# Patient Record
Sex: FEMALE | Race: WHITE | NOT HISPANIC OR LATINO
[De-identification: names, ages, dates, MRNs, and addresses within clinical notes are randomized per-mention and may not be internally consistent; named-entity substitution may affect disease eponyms.]

---

## 2020-02-07 LAB — PAP SMEAR - HIM PATIENT REPORTED: NEGATIVE

## 2020-10-06 DIAGNOSIS — G43.019 INTRACTABLE MIGRAINE WITHOUT AURA AND WITHOUT STATUS MIGRAINOSUS: Primary | ICD-10-CM

## 2020-10-06 PROBLEM — G43.909 MIGRAINE HEADACHE: Status: ACTIVE | Noted: 2020-10-06

## 2020-10-06 RX ORDER — BUTALBITAL, ACETAMINOPHEN AND CAFFEINE 50; 325; 40 MG/1; MG/1; MG/1
2 TABLET ORAL EVERY 6 HOURS PRN
Qty: 60 TABLET | Refills: 0 | Status: SHIPPED | OUTPATIENT
Start: 2020-10-06 | End: 2020-12-15

## 2020-10-06 RX ORDER — BUTALBITAL, ACETAMINOPHEN AND CAFFEINE 50; 325; 40 MG/1; MG/1; MG/1
2 TABLET ORAL EVERY 6 HOURS PRN
COMMUNITY
Start: 2020-06-29 | End: 2020-10-06

## 2020-10-06 NOTE — TELEPHONE ENCOUNTER
Overdue for follow up  Medication T'd for review and signature  Abdulaziz Byers CMA on 10/6/2020 at 7:49 AM

## 2020-12-14 DIAGNOSIS — G43.019 INTRACTABLE MIGRAINE WITHOUT AURA AND WITHOUT STATUS MIGRAINOSUS: ICD-10-CM

## 2020-12-14 NOTE — TELEPHONE ENCOUNTER
Pt calling for a refill of Butalbitol at [facility] in [place]. She will sched a 6 month F/U.

## 2020-12-15 RX ORDER — BUTALBITAL, ACETAMINOPHEN AND CAFFEINE 50; 325; 40 MG/1; MG/1; MG/1
2 TABLET ORAL EVERY 6 HOURS PRN
Qty: 60 TABLET | Refills: 0 | Status: SHIPPED | OUTPATIENT
Start: 2020-12-15 | End: 2021-02-03

## 2020-12-18 DIAGNOSIS — G43.019 INTRACTABLE MIGRAINE WITHOUT AURA AND WITHOUT STATUS MIGRAINOSUS: Primary | ICD-10-CM

## 2020-12-18 RX ORDER — DIVALPROEX SODIUM 250 MG/1
1 TABLET, EXTENDED RELEASE ORAL DAILY
COMMUNITY
Start: 2020-08-31 | End: 2020-12-18

## 2020-12-18 RX ORDER — DIVALPROEX SODIUM 250 MG/1
250 TABLET, EXTENDED RELEASE ORAL DAILY
Qty: 30 TABLET | Refills: 0 | Status: SHIPPED | OUTPATIENT
Start: 2020-12-18 | End: 2020-12-24

## 2020-12-18 NOTE — TELEPHONE ENCOUNTER
due for follow up   Medication T'd for review and signature  Abdulaziz Byers CMA on 12/18/2020 at 9:15 AM

## 2020-12-18 NOTE — LETTER
12/18/2020        RE: Pao Baeza  [address] [place] [address]            Dear Pao,      We recently provided you with medication refills.  Many medications require routine follow-up with your doctor.    Your prescription(s) have been refilled for 90 days so you may have time for the above noted follow-up. Please call to schedule soon so we can assure you have an appointment before your next refills are needed. If you have already made a follow up appointment, please disregard this letter.             Sincerely,        Bean Mercer MD  [facility]     (Formerly known as [facility])

## 2020-12-24 RX ORDER — DIVALPROEX SODIUM 250 MG/1
250 TABLET, EXTENDED RELEASE ORAL DAILY
Qty: 90 TABLET | Refills: 0 | Status: SHIPPED | OUTPATIENT
Start: 2020-12-24 | End: 2021-02-03

## 2020-12-24 NOTE — TELEPHONE ENCOUNTER
Insurance only covers 90 day supply  She has an appt scheduled for March  Medication T'd for review and signature  Preethi Ojeda CMA on 12/24/2020 at 11:40 AM

## 2021-02-03 ENCOUNTER — RECORDS - HEALTHEAST (OUTPATIENT)
Dept: ADMINISTRATIVE | Facility: OTHER | Age: 46
End: 2021-02-03

## 2021-02-03 ENCOUNTER — VIRTUAL VISIT (OUTPATIENT)
Dept: NEUROLOGY | Facility: CLINIC | Age: 46
End: 2021-02-03
Payer: COMMERCIAL

## 2021-02-03 VITALS — BODY MASS INDEX: 23.7 KG/M2 | HEIGHT: 66 IN | WEIGHT: 147.5 LBS

## 2021-02-03 DIAGNOSIS — R55 TRANSIENT LOSS OF CONSCIOUSNESS: ICD-10-CM

## 2021-02-03 DIAGNOSIS — G43.019 INTRACTABLE MIGRAINE WITHOUT AURA AND WITHOUT STATUS MIGRAINOSUS: Primary | ICD-10-CM

## 2021-02-03 PROBLEM — R40.20 LOC (LOSS OF CONSCIOUSNESS) (H): Status: ACTIVE | Noted: 2018-05-25

## 2021-02-03 PROCEDURE — 99214 OFFICE O/P EST MOD 30 MIN: CPT | Mod: 95 | Performed by: PSYCHIATRY & NEUROLOGY

## 2021-02-03 RX ORDER — TIZANIDINE 2 MG/1
2 TABLET ORAL DAILY PRN
COMMUNITY
Start: 2021-01-15

## 2021-02-03 RX ORDER — DIVALPROEX SODIUM 250 MG/1
250 TABLET, EXTENDED RELEASE ORAL DAILY
Qty: 90 TABLET | Refills: 3 | Status: SHIPPED | OUTPATIENT
Start: 2021-02-03

## 2021-02-03 RX ORDER — RIZATRIPTAN BENZOATE 10 MG/1
10 TABLET ORAL PRN
Qty: 12 TABLET | Refills: 11 | Status: SHIPPED | OUTPATIENT
Start: 2021-02-03

## 2021-02-03 RX ORDER — BUTALBITAL, ACETAMINOPHEN AND CAFFEINE 50; 325; 40 MG/1; MG/1; MG/1
2 TABLET ORAL EVERY 6 HOURS PRN
Qty: 60 TABLET | Refills: 5 | Status: SHIPPED | OUTPATIENT
Start: 2021-02-03 | End: 2022-01-20

## 2021-02-03 RX ORDER — LAMOTRIGINE 25 MG/1
50 TABLET ORAL DAILY
Qty: 180 TABLET | Refills: 3 | Status: SHIPPED | OUTPATIENT
Start: 2021-02-03 | End: 2022-02-25

## 2021-02-03 RX ORDER — RIZATRIPTAN BENZOATE 10 MG/1
10 TABLET ORAL PRN
COMMUNITY
Start: 2019-06-03 | End: 2021-02-03

## 2021-02-03 RX ORDER — FLUTICASONE PROPIONATE 50 MCG
1-2 SPRAY, SUSPENSION (ML) NASAL
Status: ON HOLD | COMMUNITY
End: 2022-03-08

## 2021-02-03 RX ORDER — MULTIVITAMIN
1 TABLET ORAL DAILY
COMMUNITY
Start: 2019-09-06

## 2021-02-03 RX ORDER — GABAPENTIN 300 MG/1
600 CAPSULE ORAL 3 TIMES DAILY
COMMUNITY
Start: 2019-09-06

## 2021-02-03 RX ORDER — ATENOLOL 50 MG/1
100 TABLET ORAL DAILY
Status: ON HOLD | COMMUNITY
Start: 2021-01-15 | End: 2022-03-08

## 2021-02-03 RX ORDER — PREDNISONE 20 MG/1
20 TABLET ORAL DAILY
Qty: 3 TABLET | Refills: 0 | Status: SHIPPED | OUTPATIENT
Start: 2021-02-03 | End: 2021-02-06

## 2021-02-03 RX ORDER — DESOGESTREL AND ETHINYL ESTRADIOL 0.15-0.03
1 KIT ORAL
Status: ON HOLD | COMMUNITY
Start: 2021-01-15 | End: 2022-03-08

## 2021-02-03 RX ORDER — DICYCLOMINE HCL 20 MG
20 TABLET ORAL 4 TIMES DAILY
Status: ON HOLD | COMMUNITY
Start: 2021-01-15 | End: 2022-03-08

## 2021-02-03 RX ORDER — LAMOTRIGINE 25 MG/1
2 TABLET ORAL DAILY
COMMUNITY
Start: 2020-03-26 | End: 2021-02-03

## 2021-02-03 RX ORDER — AMITRIPTYLINE HYDROCHLORIDE 10 MG/1
TABLET ORAL
COMMUNITY
Start: 2021-01-15

## 2021-02-03 ASSESSMENT — MIFFLIN-ST. JEOR: SCORE: 1340.81

## 2021-02-03 NOTE — NURSING NOTE
Chief Complaint   Patient presents with     Follow Up     1/13/2021 patient reports that she had LOC for 20 minutes, she had some additional symptoms after that with frequent migraines as well. She was not seen in ER or UC however was advised by pco to follow up with Neurology for further work up      Video visit Smart phone   [phone]  Abdulaziz Byers CMA on 2/3/2021 at 9:23 AM

## 2021-02-03 NOTE — PROGRESS NOTES
"[facility] Neurology  [place]    Pao Baeza MRN# [id number]   Age: 45 year old YOB: 1975               Assessment and Plan:   Assessment:   Migraine headaches  Episode of decreased awareness on January 13, 2021        Plan:   Orders Placed This Encounter   Procedures     MR Brain w/o & w Contrast     EEG Routine     I renewed her migraine medications as well.  If the EEG shows any evidence of seizure, that might explain her episode in mid January.  She is already on a small dose of lamotrigine and some Depakote for her migraines.  If there is evidence of seizure then we would need to discuss increasing the lamotrigine or the Depakote.             Chief Complaint/HPI:     I saw the patient for a video visit today.  I last saw her in June 2019.  About 3 weeks ago she had \"a different migraine.\"  This occurred around midday.  For 6 seconds she thought \"oh I am dizzy, although I am going to pass out.\"  With her migraines, normally she is not completely unconscious.  On this occasion she had put on a sweater but it was inside out and that is unusual for her.  She felt like her ankles did not work, her fingers and face were numb.  This lasted for about 2 hours, she could not go to sleep.  She did not want to take any medication for it.  The next day she had difficulty with near vision.  She had trouble putting on make-up and logging in on her computer.  Then, around 9 AM, her vision was fine but then she had a massive migraine.  For 3 weeks she has had migraine every other day.  The meds to work.  She will take 2 of the Fioricet and if the headache is not gone 45 minutes later will she will take the Maxalt and that will usually take care of it.  It looks like she has been on lamotrigine since December 2020.            Past Medical History:    has a past medical history of Hypertension and Migraines.          Past Surgical History:    has no past surgical history on file.          Social " History:     Social History     Tobacco Use     Smoking status: Never Smoker     Smokeless tobacco: Never Used   Substance Use Topics     Alcohol use: Not Currently             Family History:     Family History   Problem Relation Age of Onset     Fibromyalgia Mother      Arthritis Mother      Migraines Mother      Depression Father      Dementia Father                 Allergies:     Allergies   Allergen Reactions     Erythromycin      severe vomiting     Sulfa Drugs      Other reaction(s): Vomiting     Tetracyclines      vomiting             Medications:     Current Outpatient Medications:      amitriptyline (ELAVIL) 10 MG tablet, Take 2 tabs by mouth at bedtime, may take 1-2 tabs during the day if needed., Disp: , Rfl:      atenolol (TENORMIN) 50 MG tablet, Take 100 mg by mouth daily, Disp: , Rfl:      butalbital-acetaminophen-caffeine (ESGIC) -40 MG tablet, Take 2 tablets by mouth every 6 hours as needed for headaches, Disp: 60 tablet, Rfl: 5     desogestrel-ethinyl estradiol (APRI) 0.15-30 MG-MCG tablet, Take 1 tablet by mouth, Disp: , Rfl:      dicyclomine (BENTYL) 20 MG tablet, Take 20 mg by mouth 4 times daily, Disp: , Rfl:      divalproex sodium extended-release (DEPAKOTE ER) 250 MG 24 hr tablet, Take 1 tablet (250 mg) by mouth daily MUST CALL TO SCHEDULE FOLLOW UP APPT, Disp: 90 tablet, Rfl: 3     fluticasone (FLONASE) 50 MCG/ACT nasal spray, Spray 1-2 sprays in nostril, Disp: , Rfl:      gabapentin (NEURONTIN) 300 MG capsule, Take 600 mg by mouth 3 times daily, Disp: , Rfl:      lamoTRIgine (LAMICTAL) 25 MG tablet, Take 2 tablets (50 mg) by mouth daily, Disp: 180 tablet, Rfl: 3     Multiple Vitamin (DAILY REGINA) TABS, Take 1 tablet by mouth daily, Disp: , Rfl:      predniSONE (DELTASONE) 20 MG tablet, Take 1 tablet (20 mg) by mouth daily for 3 days, Disp: 3 tablet, Rfl: 0     rizatriptan (MAXALT) 10 MG tablet, Take 1 tablet (10 mg) by mouth as needed for migraine, Disp: 12 tablet, Rfl: 11      tiZANidine (ZANAFLEX) 2 MG tablet, Take 2 mg by mouth daily as needed, Disp: , Rfl:               Physical Exam:   Awake and alert with no aphasia no dysarthria  Speech is clear and coherent  Cranial nerves are fine  I do not see any focal or lateralized weakness or coordination difficulties in the arms  Gait looks steady here on the video.          Video-Visit Details    Type of service:  Video Visit    Video Start Time: 9:37 AM  Video End Time: 9:52 AM    Originating Location (pt. Location): Home  Distant Location (provider location):  [facility] NEUROLOGY [place]   Platform used for Video Visit: Te Mercer MD

## 2021-02-12 ENCOUNTER — HOSPITAL ENCOUNTER (OUTPATIENT)
Dept: MRI IMAGING | Facility: HOSPITAL | Age: 46
Discharge: HOME | End: 2021-02-12
Attending: PSYCHIATRY & NEUROLOGY

## 2021-02-12 DIAGNOSIS — G43.019 INTRACTABLE MIGRAINE WITHOUT AURA AND WITHOUT STATUS MIGRAINOSUS: ICD-10-CM

## 2021-02-12 DIAGNOSIS — R55 TRANSIENT LOSS OF CONSCIOUSNESS: ICD-10-CM

## 2021-03-08 ENCOUNTER — ANCILLARY PROCEDURE (OUTPATIENT)
Dept: NEUROLOGY | Facility: CLINIC | Age: 46
End: 2021-03-08
Attending: PSYCHIATRY & NEUROLOGY
Payer: COMMERCIAL

## 2021-03-08 DIAGNOSIS — R55 TRANSIENT LOSS OF CONSCIOUSNESS: ICD-10-CM

## 2021-03-08 DIAGNOSIS — G43.019 INTRACTABLE MIGRAINE WITHOUT AURA AND WITHOUT STATUS MIGRAINOSUS: ICD-10-CM

## 2021-03-08 PROCEDURE — 95816 EEG AWAKE AND DROWSY: CPT | Performed by: PSYCHIATRY & NEUROLOGY

## 2021-05-01 ENCOUNTER — HEALTH MAINTENANCE LETTER (OUTPATIENT)
Age: 46
End: 2021-05-01

## 2021-05-31 ENCOUNTER — RECORDS - HEALTHEAST (OUTPATIENT)
Dept: ADMINISTRATIVE | Facility: CLINIC | Age: 46
End: 2021-05-31

## 2021-06-01 ENCOUNTER — RECORDS - HEALTHEAST (OUTPATIENT)
Dept: ADMINISTRATIVE | Facility: CLINIC | Age: 46
End: 2021-06-01

## 2021-10-11 ENCOUNTER — HEALTH MAINTENANCE LETTER (OUTPATIENT)
Age: 46
End: 2021-10-11

## 2021-11-15 LAB — HEP C HIM: NORMAL

## 2021-11-17 LAB
ABO (EXTERNAL): NORMAL
HEMOGLOBIN (EXTERNAL): 13.3 G/DL (ref 12–16)
HEPATITIS B SURFACE ANTIGEN (EXTERNAL): NONREACTIVE
HIV1+2 AB SERPL QL IA: NEGATIVE
PLATELET COUNT (EXTERNAL): 208 10E3/UL (ref 150–400)
RH (EXTERNAL): POSITIVE
RUBELLA ANTIBODY IGG (EXTERNAL): NORMAL
TREPONEMA PALLIDUM ANTIBODY (EXTERNAL): NONREACTIVE

## 2021-11-22 ENCOUNTER — TRANSFERRED RECORDS (OUTPATIENT)
Dept: HEALTH INFORMATION MANAGEMENT | Facility: CLINIC | Age: 46
End: 2021-11-22
Payer: COMMERCIAL

## 2021-11-22 ENCOUNTER — MEDICAL CORRESPONDENCE (OUTPATIENT)
Dept: HEALTH INFORMATION MANAGEMENT | Facility: CLINIC | Age: 46
End: 2021-11-22
Payer: COMMERCIAL

## 2021-11-23 ENCOUNTER — TRANSCRIBE ORDERS (OUTPATIENT)
Dept: MATERNAL FETAL MEDICINE | Facility: HOSPITAL | Age: 46
End: 2021-11-23
Payer: COMMERCIAL

## 2021-11-23 DIAGNOSIS — O26.90 PREGNANCY RELATED CONDITION, ANTEPARTUM: Primary | ICD-10-CM

## 2021-12-01 ENCOUNTER — TRANSCRIBE ORDERS (OUTPATIENT)
Dept: MATERNAL FETAL MEDICINE | Facility: HOSPITAL | Age: 46
End: 2021-12-01
Payer: COMMERCIAL

## 2021-12-01 DIAGNOSIS — O26.90 PREGNANCY RELATED CONDITION, ANTEPARTUM: Primary | ICD-10-CM

## 2021-12-07 ENCOUNTER — HOSPITAL ENCOUNTER (EMERGENCY)
Facility: HOSPITAL | Age: 46
Discharge: HOME | End: 2021-12-07
Attending: EMERGENCY MEDICINE | Admitting: EMERGENCY MEDICINE
Payer: COMMERCIAL

## 2021-12-07 VITALS
OXYGEN SATURATION: 100 % | WEIGHT: 147 LBS | BODY MASS INDEX: 23.07 KG/M2 | HEIGHT: 67 IN | SYSTOLIC BLOOD PRESSURE: 115 MMHG | DIASTOLIC BLOOD PRESSURE: 66 MMHG | HEART RATE: 83 BPM | RESPIRATION RATE: 18 BRPM | TEMPERATURE: 97.6 F

## 2021-12-07 DIAGNOSIS — Z3A.23 23 WEEKS GESTATION OF PREGNANCY: ICD-10-CM

## 2021-12-07 DIAGNOSIS — R00.2 PALPITATIONS: ICD-10-CM

## 2021-12-07 DIAGNOSIS — E83.42 HYPOMAGNESEMIA: ICD-10-CM

## 2021-12-07 DIAGNOSIS — G43.909 MIGRAINE WITHOUT STATUS MIGRAINOSUS, NOT INTRACTABLE, UNSPECIFIED MIGRAINE TYPE: ICD-10-CM

## 2021-12-07 LAB
ALBUMIN UR-MCNC: NEGATIVE MG/DL
ANION GAP SERPL CALCULATED.3IONS-SCNC: 9 MMOL/L (ref 5–18)
APPEARANCE UR: CLEAR
BACTERIA #/AREA URNS HPF: ABNORMAL /HPF
BILIRUB UR QL STRIP: NEGATIVE
BNP SERPL-MCNC: 11 PG/ML (ref 0–64)
BUN SERPL-MCNC: 7 MG/DL (ref 8–22)
CALCIUM SERPL-MCNC: 8.6 MG/DL (ref 8.5–10.5)
CHLORIDE BLD-SCNC: 105 MMOL/L (ref 98–107)
CO2 SERPL-SCNC: 24 MMOL/L (ref 22–31)
COLOR UR AUTO: ABNORMAL
CREAT SERPL-MCNC: 0.57 MG/DL (ref 0.6–1.1)
ERYTHROCYTE [DISTWIDTH] IN BLOOD BY AUTOMATED COUNT: 13.7 % (ref 10–15)
GFR SERPL CREATININE-BSD FRML MDRD: >90 ML/MIN/1.73M2
GLUCOSE BLD-MCNC: 91 MG/DL (ref 70–125)
GLUCOSE UR STRIP-MCNC: NEGATIVE MG/DL
HCT VFR BLD AUTO: 40.2 % (ref 35–47)
HGB BLD-MCNC: 13.1 G/DL (ref 11.7–15.7)
HGB UR QL STRIP: NEGATIVE
HOLD SPECIMEN: NORMAL
KETONES UR STRIP-MCNC: NEGATIVE MG/DL
LEUKOCYTE ESTERASE UR QL STRIP: ABNORMAL
MAGNESIUM SERPL-MCNC: 1.7 MG/DL (ref 1.8–2.6)
MCH RBC QN AUTO: 30.7 PG (ref 26.5–33)
MCHC RBC AUTO-ENTMCNC: 32.6 G/DL (ref 31.5–36.5)
MCV RBC AUTO: 94 FL (ref 78–100)
MUCOUS THREADS #/AREA URNS LPF: PRESENT /LPF
NITRATE UR QL: NEGATIVE
PH UR STRIP: 7 [PH] (ref 5–7)
PLATELET # BLD AUTO: 196 10E3/UL (ref 150–450)
POTASSIUM BLD-SCNC: 3.6 MMOL/L (ref 3.5–5)
RBC # BLD AUTO: 4.27 10E6/UL (ref 3.8–5.2)
RBC URINE: <1 /HPF
SODIUM SERPL-SCNC: 138 MMOL/L (ref 136–145)
SP GR UR STRIP: 1.02 (ref 1–1.03)
SQUAMOUS EPITHELIAL: <1 /HPF
TROPONIN I SERPL-MCNC: <0.01 NG/ML (ref 0–0.29)
TSH SERPL DL<=0.005 MIU/L-ACNC: 1.76 UIU/ML (ref 0.3–5)
UROBILINOGEN UR STRIP-MCNC: <2 MG/DL
WBC # BLD AUTO: 7.8 10E3/UL (ref 4–11)
WBC URINE: 12 /HPF

## 2021-12-07 PROCEDURE — 85027 COMPLETE CBC AUTOMATED: CPT | Performed by: EMERGENCY MEDICINE

## 2021-12-07 PROCEDURE — 80048 BASIC METABOLIC PNL TOTAL CA: CPT | Performed by: EMERGENCY MEDICINE

## 2021-12-07 PROCEDURE — 250N000011 HC RX IP 250 OP 636: Performed by: EMERGENCY MEDICINE

## 2021-12-07 PROCEDURE — 258N000003 HC RX IP 258 OP 636: Performed by: EMERGENCY MEDICINE

## 2021-12-07 PROCEDURE — 87086 URINE CULTURE/COLONY COUNT: CPT | Performed by: EMERGENCY MEDICINE

## 2021-12-07 PROCEDURE — 83880 ASSAY OF NATRIURETIC PEPTIDE: CPT | Performed by: EMERGENCY MEDICINE

## 2021-12-07 PROCEDURE — 84443 ASSAY THYROID STIM HORMONE: CPT | Performed by: EMERGENCY MEDICINE

## 2021-12-07 PROCEDURE — 84484 ASSAY OF TROPONIN QUANT: CPT | Performed by: EMERGENCY MEDICINE

## 2021-12-07 PROCEDURE — 83735 ASSAY OF MAGNESIUM: CPT | Performed by: EMERGENCY MEDICINE

## 2021-12-07 PROCEDURE — 93005 ELECTROCARDIOGRAM TRACING: CPT | Performed by: EMERGENCY MEDICINE

## 2021-12-07 PROCEDURE — 81001 URINALYSIS AUTO W/SCOPE: CPT | Performed by: EMERGENCY MEDICINE

## 2021-12-07 PROCEDURE — 96365 THER/PROPH/DIAG IV INF INIT: CPT

## 2021-12-07 PROCEDURE — 99284 EMERGENCY DEPT VISIT MOD MDM: CPT | Mod: 25

## 2021-12-07 PROCEDURE — 96366 THER/PROPH/DIAG IV INF ADDON: CPT

## 2021-12-07 PROCEDURE — 36415 COLL VENOUS BLD VENIPUNCTURE: CPT | Performed by: EMERGENCY MEDICINE

## 2021-12-07 PROCEDURE — 96375 TX/PRO/DX INJ NEW DRUG ADDON: CPT

## 2021-12-07 RX ORDER — METOCLOPRAMIDE HYDROCHLORIDE 5 MG/ML
5 INJECTION INTRAMUSCULAR; INTRAVENOUS ONCE
Status: COMPLETED | OUTPATIENT
Start: 2021-12-07 | End: 2021-12-07

## 2021-12-07 RX ORDER — MAGNESIUM SULFATE HEPTAHYDRATE 40 MG/ML
2 INJECTION, SOLUTION INTRAVENOUS ONCE
Status: COMPLETED | OUTPATIENT
Start: 2021-12-07 | End: 2021-12-07

## 2021-12-07 RX ADMIN — SODIUM CHLORIDE, POTASSIUM CHLORIDE, SODIUM LACTATE AND CALCIUM CHLORIDE 1000 ML: 600; 310; 30; 20 INJECTION, SOLUTION INTRAVENOUS at 13:49

## 2021-12-07 RX ADMIN — MAGNESIUM SULFATE HEPTAHYDRATE 2 G: 40 INJECTION, SOLUTION INTRAVENOUS at 13:10

## 2021-12-07 RX ADMIN — METOCLOPRAMIDE HYDROCHLORIDE 5 MG: 5 INJECTION INTRAMUSCULAR; INTRAVENOUS at 13:48

## 2021-12-07 ASSESSMENT — ENCOUNTER SYMPTOMS
PALPITATIONS: 1
JOINT SWELLING: 0
ABDOMINAL PAIN: 0
HEMATURIA: 0
SHORTNESS OF BREATH: 0
DYSURIA: 0
DIARRHEA: 0
NAUSEA: 1
FEVER: 0
CONFUSION: 0
VOMITING: 0
SORE THROAT: 0
DIZZINESS: 1
FATIGUE: 1
HEADACHES: 1
CHILLS: 0

## 2021-12-07 ASSESSMENT — MIFFLIN-ST. JEOR: SCORE: 1354.42

## 2021-12-07 NOTE — ED PROVIDER NOTES
"    ED Provider In Triage Note  [facility]  Encounter Date: Dec 7, 2021    Chief Complaint   Patient presents with     Palpitations       Brief HPI:   Pao Baeza is a 45 year old female presenting to the Emergency Department with a chief complaint of heart racing (Max  at home).    Began 2:00p yesterday and then got better with resting. Now it is back.    Just found she is pregnant about 3 weeks ago. +nausea, dizziness, SOB, sneezing. She wonders if she \"passed out\" while lying on the couch. She does not think she was just sleeping.    No fever or cough.     Pt has had multiple falls lately and works with Neuro for this as they think it is a \"brain stem migraine\".    This is her second pregnancy (due April 2, 2022) and her son is 20 yrs old.    She is also feeling a migraine coming on - usually she will experience slurred speech and trouble speaking, trouble focusing. She will also have involuntary movement and loss of consciousness (for usually 2-5 minutes).  She reports HA 8/10.    While patient gave initial history in Triage her speech her behavior was completely normal then a few minutes in she states a migraine is coming on and her speech became more deliberate and slow. When distracted her speech is     Her OB is [facility].      Brief Physical Exam:  /80   Pulse 112   Temp 97.6  F (36.4  C) (Temporal)   Resp 19   Ht 1.702 m (5' 7\")   Wt 66.7 kg (147 lb)   SpO2 100%   BMI 23.02 kg/m    General: Non-toxic appearing  HEENT: Atraumatic  Resp: No respiratory distress  Abdomen: Non-peritoneal, +gravid  Neuro: Alert, oriented, answers questions appropriately  Psych: Behavior appropriate        Plan Initiated in Triage:  Basic labs ordered      PIT Dispo:   Place patient in the next available ED bed          Nilda Dinero MD on 12/7/2021 at 12:04 PM        Patient was evaluated by the Physician in Triage due to a limitation of available rooms in the " Emergency Department. A plan of care was discussed based on the information obtained on the initial evaluation and patient was consuled to return back to the Emergency Department lobby after this initial evalutaiton until results were obtained or a room became available in the Emergency Department. Patient was counseled not to leave prior to receiving the results of their workup.            Nilda Dinero MD  12/07/21 1212       Nilda Dinero MD  12/07/21 1257

## 2021-12-07 NOTE — DISCHARGE INSTRUCTIONS
Continue close follow up with your ob provider for ongoing evaluation and care. Follow up with primary providers as well including primary doctor and neurologist you might see.  Take over the counter magnesium replacement.  Return for new emergency concerns.

## 2021-12-07 NOTE — ED TRIAGE NOTES
Pt is 23 weeks AOG started having palpitations since yesterday 112-114.   Pt has hx of migraine headache and having slurred speech, fast HR  and hard to focus.  Having headache now.  Pt denies any vaginal bleeding and abd cramping.  Has dizziness and nausea

## 2021-12-07 NOTE — ED NOTES
Patient had LOC while MD in room. Awoke after approximately 30 seconds. Patient states this is what happens when she has migraines.

## 2021-12-08 LAB
ATRIAL RATE - MUSE: 86 BPM
BACTERIA UR CULT: NORMAL
DIASTOLIC BLOOD PRESSURE - MUSE: NORMAL MMHG
INTERPRETATION ECG - MUSE: NORMAL
P AXIS - MUSE: 67 DEGREES
PR INTERVAL - MUSE: 156 MS
QRS DURATION - MUSE: 80 MS
QT - MUSE: 378 MS
QTC - MUSE: 452 MS
R AXIS - MUSE: 51 DEGREES
SYSTOLIC BLOOD PRESSURE - MUSE: NORMAL MMHG
T AXIS - MUSE: 30 DEGREES
VENTRICULAR RATE- MUSE: 86 BPM

## 2021-12-09 ENCOUNTER — PRE VISIT (OUTPATIENT)
Dept: MATERNAL FETAL MEDICINE | Facility: HOSPITAL | Age: 46
End: 2021-12-09
Payer: COMMERCIAL

## 2021-12-16 ENCOUNTER — OFFICE VISIT (OUTPATIENT)
Dept: MATERNAL FETAL MEDICINE | Facility: HOSPITAL | Age: 46
End: 2021-12-16
Attending: OBSTETRICS & GYNECOLOGY
Payer: COMMERCIAL

## 2021-12-16 ENCOUNTER — ANCILLARY PROCEDURE (OUTPATIENT)
Dept: ULTRASOUND IMAGING | Facility: HOSPITAL | Age: 46
End: 2021-12-16
Attending: OBSTETRICS & GYNECOLOGY
Payer: COMMERCIAL

## 2021-12-16 VITALS — SYSTOLIC BLOOD PRESSURE: 102 MMHG | HEART RATE: 77 BPM | DIASTOLIC BLOOD PRESSURE: 67 MMHG

## 2021-12-16 DIAGNOSIS — O09.522 MULTIGRAVIDA OF ADVANCED MATERNAL AGE IN SECOND TRIMESTER: Primary | ICD-10-CM

## 2021-12-16 DIAGNOSIS — O26.90 PREGNANCY RELATED CONDITION, ANTEPARTUM: ICD-10-CM

## 2021-12-16 PROCEDURE — 76811 OB US DETAILED SNGL FETUS: CPT | Mod: 26 | Performed by: OBSTETRICS & GYNECOLOGY

## 2021-12-16 PROCEDURE — 76811 OB US DETAILED SNGL FETUS: CPT

## 2021-12-16 PROCEDURE — 99203 OFFICE O/P NEW LOW 30 MIN: CPT | Mod: 25 | Performed by: OBSTETRICS & GYNECOLOGY

## 2021-12-16 NOTE — PROGRESS NOTES
Please see full imaging report from ViewPoint program under imaging tab.    Kevon Calderon MD  Maternal Fetal Medicine

## 2021-12-16 NOTE — PROGRESS NOTES
Maternal-Fetal Medicine Consult  Requesting provider - Dr. Christiana Fierro, [facility]  Performing provider - Dr. Kevon AQUINO [facility]    Dear Dr. Fierro    Thank you for allowing us to see Marleny and her partner Nicolas today for a consultation for advanced maternal age, chronic hypertension and other co-morbidities in pregnancy. I spent 35 minutes with them during today's visit, separate from the US, with > 50% of this time spent in counseling and consultation regarding treatment and management of maternal complications of pregnancy.     An [facility] US was also performed today, with no sonographic concerns regarding fetal development or growth.     HPI  Marleny is a 46 year old  at 24 weeks 5 days by a 20 week 2 day US with uncertain LMP. She is here to discuss pregnancy complications including AMA 40 + years, chronic hypertension on atenolol and a history of  birth.      OB History    - pregnancy complicated by severe hyperemesis gravidarum, throughout the pregnancy, and  labor. She was placed on bedrest and given a medication to stop the contractions. She eventually developed progressive  labor at 34 weeks, with breech presentation, and underwent a C/S. That child has/had no health concerns.     She has also had two early spontaneous losses.   This was an unplanned and unexpected pregnancy as Marleny was not sure she was still able to become pregnant.     GYN History -   Lichen sclerosis - long standing, uses topical lidocaine and clobetasol    PMH complicating pregnancy-   1. AMA 40+ years - low risk screening  2. Anxiety/depression - stable  3. Chronic hypertension - has been on atenolol for some time, also helps with migraines. Normal baseline labs.   4. 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 Mercer, neurology  5. Fibromyalgia - leg weakness and chronic pain. Uses assistance with ambulation  6. Irritable bowel disorder -  stable  7. Asthma - stable  8. Nausea and vomiting of pregnancy.    PSHx-   1. C/S at 34 weeks,  - had recurrent  labor and severe nausea and vomiting. Missed a dose of long term medication that she had been taking for contractions and labor progressed. Was breech and needed a C/S. Considering TOLAC.   2. Appendectomy  3. Tonsillectomy/adenoidectomy    Social - here with , supportive  Works as  at a dental office. Has had to use a wheelchair for ambulation due to fibromyalgia. Also uses other assistance with ambulation.   Limiting in person hours at this time, hoping to move to more work from home as possible.     Medications -   Amitriptyline - 20 mg at night  Atenolol 100 mg daily  Dicyclomine - 20 mg 4 times a day  Flonase - daily nasal spray  Lamotrigine 50 mg daily  Butalbital-acetaminophen-caffeine - takes at least daily  Vistaril - 25 mg at bedtime  Flexeril - 5-10 mg daily and prn as needed  Zofran 4 mg every 6-8 hours    Allergies - demerol, erythromycin, gluten, tetracycline    Ob US   Us today with normal fetal growth and anatomy.     Ob Labs   Low risk NIPT and carrier screening  OB labs reviewed and normal  Baseline preeclampsia labs reviewed and normal  msAFP 0.73    ROS  Significant for fatigue and insomnia, chronic pain and nausea and vomiting.     /67 (BP Location: Right arm, Patient Position: Sitting, Cuff Size: Adult Regular)   Pulse 77      Exam: deferred    A/P  46 year old  at 24 weeks 5 days by 20 week 2 day US with normal fetal growth and anatomy and low risk aneuploidy screening. Current concerns include management of hypertension    All questions were answered today, and we have developed the following plans:     1. AMA 40+ years  *This places her at an increased risk of pregnancy complications. She has already completed aneuploidy screening and has a normal US today. I do recommend growth US every 4-6 weeks, and weekly BPPs (will be done  earlier than recommended for AMA, due to her CHTN) and delivery by ABHISHEK.     2. Anxiety/depression  * Stable at this time. Recommend continuation of current medications and close care with primary prescriber.  * Increased risk of postpartum worsening and PPD. Recommend more frequent surveillance in the first 6-12 weeks after delivery.     3. Chronic hypertension without evidence of end organ disease  * Atenolol is typically not recommended for use in pregnancy, as it has been associated with fetal growth restriction as well as  bradycardia, hypotension and beta-blockade. In addition, Marleny's BP was checked today and was 102/67 so I am not convinced that she needs antiHTN treatment at this time. I have recommended that she stop this medication due to fetal and  effects. She is willing to try but is worried it may worsen her severe migraines, as it has done so in the past. She will attempt to discontinue in the next week.   * if she successfully stops her atenolol, she should be started on labetolol 100 mg BID if she has BP > 150/100-110 in her pregnancy.   * if she is unable to stop her atenolol, she should be watched very closely for fetal growth (every 4 weeks) and pediatrics should be present at delivery and notified of the exposure prior to birth. Infant may require a longer admission for potential complications of necessary treatment for patient.   * Consider low dose aspirin for preeclampsia prophylaxis 81 mg daily;  although this is typically started earlier in the pregnancy can still be started up to 28 weeks.   * serial growth US every 4-6 weeks  * BPP weekly at 32 weeks    4. 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 Encouraged increased dose of vistaril (can take 25 mg at bedtime  and a second dose 3-4 hours later if still can't sleep).    5. Fibromyalgia  * no change in current care. Fibromyalgia can have varied course in pregnancy but should not adversely affect pregnancy outcomes.     6. Irritable bowel disorder  * no change in current care.     7. Asthma  *no change in current care    8. General pregnancy recommendations - [facility] did inquire about work limitations. Given her significant medical co-morbidities, and the fact that she typically needs a wheelchair to work and has severe fatigue, consideration can be given to limited at work hours (6 hours or less) and work from home for some period daily if this is supported by her work obligations, which she believes it is. I also encouraged her to look into what benefits she might have including short term disability, if this is an option. I have deferred final recommendations to her primary care team at this time.     We also recommended COVID-19 booster to optimize her health outcomes given recent data on pregnancy and with the new variant. She was vaccinated in early  so it is time for a booster. She is trying to schedule one. Discussed benefits to pregnancy and fetus/ as well.     Thank you for allowing me to share in Marleny's care today. She plans all US follow up at [facility] at this time, but we are happy to see her back at any point if needed.     Kevon Calderon MD    Maternal Fetal Medicine  [facility] [email or web address]  [phone] (main clinic)

## 2022-01-07 ENCOUNTER — TRANSFERRED RECORDS (OUTPATIENT)
Dept: HEALTH INFORMATION MANAGEMENT | Facility: CLINIC | Age: 47
End: 2022-01-07
Payer: COMMERCIAL

## 2022-01-11 ENCOUNTER — TRANSFERRED RECORDS (OUTPATIENT)
Dept: HEALTH INFORMATION MANAGEMENT | Facility: CLINIC | Age: 47
End: 2022-01-11
Payer: COMMERCIAL

## 2022-01-11 ENCOUNTER — MEDICAL CORRESPONDENCE (OUTPATIENT)
Dept: HEALTH INFORMATION MANAGEMENT | Facility: CLINIC | Age: 47
End: 2022-01-11
Payer: COMMERCIAL

## 2022-01-12 ENCOUNTER — TELEPHONE (OUTPATIENT)
Dept: EDUCATION SERVICES | Facility: CLINIC | Age: 47
End: 2022-01-12
Payer: COMMERCIAL

## 2022-01-12 NOTE — TELEPHONE ENCOUNTER
Records received   1/11/2022 12:26:22 PM, 1/11/2022 12:32:17 PM, and 1/11/2022 12:23:16 PM inside DM Consult fax    [facility] - [phone]  Referring: Dr Christiana Fierro  DX: GDM     Date: 1/21/2022 Status: Scheduled   Time: 10:00 AM Length: 60   Visit Type: VIDEO VISIT NEW [1682] Copay: $0.00   Provider: Thelma Ernst RD Department: TS DIABETES EDUCATION   Bill Area: Diabetic Education TS

## 2022-01-20 DIAGNOSIS — G43.019 INTRACTABLE MIGRAINE WITHOUT AURA AND WITHOUT STATUS MIGRAINOSUS: ICD-10-CM

## 2022-01-20 RX ORDER — BUTALBITAL, ACETAMINOPHEN AND CAFFEINE 50; 325; 40 MG/1; MG/1; MG/1
2 TABLET ORAL EVERY 6 HOURS PRN
Qty: 60 TABLET | Refills: 5 | Status: SHIPPED | OUTPATIENT
Start: 2022-01-20

## 2022-01-20 NOTE — TELEPHONE ENCOUNTER
Refill request for ESGIC. Pt last seen 2/3/21 by Dr. Mercer. Pt has not followed up since this visit. Will send letter regarding need for follow up.     Medication T'd for review and signature      Gladys Mckinney RN on 1/20/2022 at 3:26 PM     .

## 2022-01-21 ENCOUNTER — VIRTUAL VISIT (OUTPATIENT)
Dept: EDUCATION SERVICES | Facility: CLINIC | Age: 47
End: 2022-01-21
Payer: COMMERCIAL

## 2022-01-21 DIAGNOSIS — O24.419 GESTATIONAL DIABETES: Primary | ICD-10-CM

## 2022-01-21 PROCEDURE — G0108 DIAB MANAGE TRN  PER INDIV: HCPCS | Mod: 95 | Performed by: DIETITIAN, REGISTERED

## 2022-01-21 RX ORDER — LANCETS
EACH MISCELLANEOUS
Qty: 100 EACH | Refills: 6 | Status: SHIPPED | OUTPATIENT
Start: 2022-01-21

## 2022-01-21 RX ORDER — GLUCOSAMINE HCL/CHONDROITIN SU 500-400 MG
CAPSULE ORAL
Qty: 100 EACH | Refills: 3 | Status: SHIPPED | OUTPATIENT
Start: 2022-01-21

## 2022-01-21 NOTE — LETTER
1/21/2022         RE: Pao Baeza  [address] [place] [address]        Dear Colleague,    Thank you for referring your patient, Pao Baeza, to the [facility]. Please see a copy of my visit note below.    Diabetes Self-Management Education & Support  Video Visit  Start Time: 10:03 AM  End Time: 10:33 AM    SUBJECTIVE/OBJECTIVE:  Presents for education related to gestational diabetes.    Accompanied by: Self  Diabetes management related comments/concerns: GDM  Gestational weeks: 29 weeks  Next OB Visit Date: 01/21/22  Had any babies over 9 lbs: No  Previously had Gestational Diabetes: No  Have you ever had thyroid problems or taken thyroid medication?: No  Heart disease, mitral valve prolapse or rheumatic fever?: No  Hypertension : (!) Yes (during pregnancy)  High Cholesterol: No  High Triglycerides: No  Do you use tobacco products?: No  Do you drink beer, wine or hard liquor?: No    Cultural Influences/Ethnic Background:  Not  or     Estimated Date of Delivery: Apr 2, 2022    1 hour OGTT  No results found for: GLU1  Per pt 160 mg/dL   - did not tolerate the test and never took the 3 hr    Lifestyle and Health Behaviors:  Pre-pregnancy weight (lbs): 142  Exercise:: Unable to exercise (trying to walk indoors when she can)  Barrier to exercise: Physical limitation (fibromyalgia pain and heart rate issues)  Cultural/Anabaptism diet restrictions?: No  Meal planning/habits: Frequent snacking  Meals include: Breakfast,Lunch,Dinner,Morning Snack,Afternoon Snack,Evening Snack  Breakfast: 1 cup of high protein cereal with milk  Lunch: greek yogurt, lunch meet, cheese sticks  Dinner: Bags of veggies with low carb, high protein rice bowls  Snacks: Lactose free dairy. Drink A2 milk. During the night she has yogurt, yogurt drinks, cheese  Other: Dealing with hyperemesis - so eats continuously - eats at night too. Does not tolerate eggs, Patient has dairy and  gluten sensitivites. High citric acid and some lectin protein intolerance (beans, potatoes, tomatoes)  Beverages: Water  Biggest challenges to healthy eating: None    Healthy Coping:  Emotional response to diabetes: Ready to learn  Informal Support system:: Family  Stage of change: ACTION (Actively working towards change)    Current Management:  Taking medications for gestational diabetes?: No  Difficulty affording diabetes medication?: No  Difficulty affording diabetes testing supplies?: No    ASSESSMENT:  Discussed GDM diagnosis, BG control, food choices. Patient has difficulty with hyperemesis, so she eats every 2-3 hours all day and during the night. Likely will not have a fasting BG to read. Discussed keeping BG <120 mg/dL 2 hrs post meal and <140 mg/dL 1 hr post meal    INTERVENTION:  Patient was instructed on glucose meter- pt has no concerns - has seen people use them in the past.     Educational topics covered today:  GDM diagnosis, pathophysiology, Risks and Complications of GDM, Means of controlling GDM, Using a Blood Glucose Monitor, Blood Glucose Goals, Logging and Interpreting Glucose Results, Ketone Testing, When to Call a Diabetes Educator or OB Provider, Healthy Eating During Pregnancy, Counting Carbohydrates, Meal Planning for GDM, and Physical Activity    Educational materials provided today:   Email material per pt request    Pt verbalized understanding of concepts discussed and recommendations provided today.     PLAN:  Check glucose 4 times daily, before breakfast and 1 hour after each meal.     Check Ketones daily for one week, if negative, reduce testing to once a week.     Physical activity recommended: limited with fibromyalgia.    Meal plan: 30 grams carbs at breakfast, 45-60 grams carbs at lunch, 45-60 grams carbs at supper, 15-30 grams carbs at 3 snacks a day.  Follow consistent CHO meal plan, eat CHO and protein/fat at all meals/snacks.    Call/e-mail/IN-PIPE TECHNOLOGYhart message diabetes educator  if 3 or more blood sugars are above the goal in 1 week, if ketones are positive, or with questions/concerns.    Time Spent: 30 minutes  Encounter Type: Individual    Any diabetes medication dose changes were made via the CDE Protocol and Collaborative Practice Agreement with the patient's referring care provider. A copy of this encounter was shared with the provider.

## 2022-01-21 NOTE — PATIENT INSTRUCTIONS
Goals for Diabetes Care:    1. Eat balanced meals (3 meals + 3 snacks daily)    Breakfast: 30 grams carbohydrate + Protein  Snack: 15-30 grams carbohydrate + protein  Lunch: 45-60 grams carbohydrate + protein/vegetables  Snack: 15-30 grams Carbohydrate + protein  Dinner: 45-60 grams carbohydrate + protein/vegetables  Bedtime Snack: 15-30 grams + protein    ----Make sure you include protein source with each meal and at bedtime - this has been shown to help with blood glucose elevations    2. Each Morning Check Ketones (small/mod/high - call Diabetes Care Line)  Your goal is negative or trace ketones. If you have ketones in your urine it means you are not eating enough before you go to bed. Eat a larger bedtime snack and include protein.     3. Aim to get at least 30 minutes of activity each day. Activity really helps improve blood sugars.     4. Blood Glucose Targets:   1. Fasting Less than 95 mg/dL   2. 1 hours after a meal target is less than 140 mg/dL  ----Always remember to bring meter and log book to all appointments.      Follow up with your Diabetic Educator as needed to assess BG targets in 1 week.  If Blood glucose levels are above normal 3 times or more in one week and you cannot explain them or if you develop small, moderate or high ketones call Diabetes Care at [phone]    Call with any questions.    Thank you,  Thelma Ernst RDN, JUDI, [facility]   Certified Diabetes Care &   [phone]

## 2022-01-21 NOTE — PROGRESS NOTES
Diabetes Self-Management Education & Support  Video Visit  Start Time: 10:03 AM  End Time: 10:33 AM    SUBJECTIVE/OBJECTIVE:  Presents for education related to gestational diabetes.    Accompanied by: Self  Diabetes management related comments/concerns: GDM  Gestational weeks: 29 weeks  Next OB Visit Date: 01/21/22  Had any babies over 9 lbs: No  Previously had Gestational Diabetes: No  Have you ever had thyroid problems or taken thyroid medication?: No  Heart disease, mitral valve prolapse or rheumatic fever?: No  Hypertension : (!) Yes (during pregnancy)  High Cholesterol: No  High Triglycerides: No  Do you use tobacco products?: No  Do you drink beer, wine or hard liquor?: No    Cultural Influences/Ethnic Background:  Not  or     Estimated Date of Delivery: Apr 2, 2022    1 hour OGTT  No results found for: GLU1  Per pt 160 mg/dL   - did not tolerate the test and never took the 3 hr    Lifestyle and Health Behaviors:  Pre-pregnancy weight (lbs): 142  Exercise:: Unable to exercise (trying to walk indoors when she can)  Barrier to exercise: Physical limitation (fibromyalgia pain and heart rate issues)  Cultural/Yazidism diet restrictions?: No  Meal planning/habits: Frequent snacking  Meals include: Breakfast,Lunch,Dinner,Morning Snack,Afternoon Snack,Evening Snack  Breakfast: 1 cup of high protein cereal with milk  Lunch: greek yogurt, lunch meet, cheese sticks  Dinner: Bags of veggies with low carb, high protein rice bowls  Snacks: Lactose free dairy. Drink A2 milk. During the night she has yogurt, yogurt drinks, cheese  Other: Dealing with hyperemesis - so eats continuously - eats at night too. Does not tolerate eggs, Patient has dairy and gluten sensitivites. High citric acid and some lectin protein intolerance (beans, potatoes, tomatoes)  Beverages: Water  Biggest challenges to healthy eating: None    Healthy Coping:  Emotional response to diabetes: Ready to learn  Informal Support system::  Family  Stage of change: ACTION (Actively working towards change)    Current Management:  Taking medications for gestational diabetes?: No  Difficulty affording diabetes medication?: No  Difficulty affording diabetes testing supplies?: No    ASSESSMENT:  Discussed GDM diagnosis, BG control, food choices. Patient has difficulty with hyperemesis, so she eats every 2-3 hours all day and during the night. Likely will not have a fasting BG to read. Discussed keeping BG <120 mg/dL 2 hrs post meal and <140 mg/dL 1 hr post meal    INTERVENTION:  Patient was instructed on glucose meter- pt has no concerns - has seen people use them in the past.     Educational topics covered today:  GDM diagnosis, pathophysiology, Risks and Complications of GDM, Means of controlling GDM, Using a Blood Glucose Monitor, Blood Glucose Goals, Logging and Interpreting Glucose Results, Ketone Testing, When to Call a Diabetes Educator or OB Provider, Healthy Eating During Pregnancy, Counting Carbohydrates, Meal Planning for GDM, and Physical Activity    Educational materials provided today:   Email material per pt request    Pt verbalized understanding of concepts discussed and recommendations provided today.     PLAN:  Check glucose 4 times daily, before breakfast and 1 hour after each meal.     Check Ketones daily for one week, if negative, reduce testing to once a week.     Physical activity recommended: limited with fibromyalgia.    Meal plan: 30 grams carbs at breakfast, 45-60 grams carbs at lunch, 45-60 grams carbs at supper, 15-30 grams carbs at 3 snacks a day.  Follow consistent CHO meal plan, eat CHO and protein/fat at all meals/snacks.    Call/e-mail/MyChart message diabetes educator if 3 or more blood sugars are above the goal in 1 week, if ketones are positive, or with questions/concerns.    Time Spent: 30 minutes  Encounter Type: Individual    Any diabetes medication dose changes were made via the CDE Protocol and Collaborative  Practice Agreement with the patient's referring care provider. A copy of this encounter was shared with the provider.

## 2022-01-21 NOTE — LETTER
1/21/2022         RE: Pao Baeza  [address] [place] [address]        Dear Colleague,    Thank you for referring your patient, Pao Baeza, to the [facility]. Please see a copy of my visit note below.    Diabetes Self-Management Education & Support  Video Visit  Start Time: 10:03 AM  End Time: 10:33 AM    SUBJECTIVE/OBJECTIVE:  Presents for education related to gestational diabetes.    Accompanied by: Self  Diabetes management related comments/concerns: GDM  Gestational weeks: 29 weeks  Next OB Visit Date: 01/21/22  Had any babies over 9 lbs: No  Previously had Gestational Diabetes: No  Have you ever had thyroid problems or taken thyroid medication?: No  Heart disease, mitral valve prolapse or rheumatic fever?: No  Hypertension : (!) Yes (during pregnancy)  High Cholesterol: No  High Triglycerides: No  Do you use tobacco products?: No  Do you drink beer, wine or hard liquor?: No    Cultural Influences/Ethnic Background:  Not  or     Estimated Date of Delivery: Apr 2, 2022    1 hour OGTT  No results found for: GLU1  Per pt 160 mg/dL   - did not tolerate the test and never took the 3 hr    Lifestyle and Health Behaviors:  Pre-pregnancy weight (lbs): 142  Exercise:: Unable to exercise (trying to walk indoors when she can)  Barrier to exercise: Physical limitation (fibromyalgia pain and heart rate issues)  Cultural/Amish diet restrictions?: No  Meal planning/habits: Frequent snacking  Meals include: Breakfast,Lunch,Dinner,Morning Snack,Afternoon Snack,Evening Snack  Breakfast: 1 cup of high protein cereal with milk  Lunch: greek yogurt, lunch meet, cheese sticks  Dinner: Bags of veggies with low carb, high protein rice bowls  Snacks: Lactose free dairy. Drink A2 milk. During the night she has yogurt, yogurt drinks, cheese  Other: Dealing with hyperemesis - so eats continuously - eats at night too. Does not tolerate eggs, Patient has dairy and  gluten sensitivites. High citric acid and some lectin protein intolerance (beans, potatoes, tomatoes)  Beverages: Water  Biggest challenges to healthy eating: None    Healthy Coping:  Emotional response to diabetes: Ready to learn  Informal Support system:: Family  Stage of change: ACTION (Actively working towards change)    Current Management:  Taking medications for gestational diabetes?: No  Difficulty affording diabetes medication?: No  Difficulty affording diabetes testing supplies?: No    ASSESSMENT:  Discussed GDM diagnosis, BG control, food choices. Patient has difficulty with hyperemesis, so she eats every 2-3 hours all day and during the night. Likely will not have a fasting BG to read. Discussed keeping BG <120 mg/dL 2 hrs post meal and <140 mg/dL 1 hr post meal    INTERVENTION:  Patient was instructed on glucose meter- pt has no concerns - has seen people use them in the past.     Educational topics covered today:  GDM diagnosis, pathophysiology, Risks and Complications of GDM, Means of controlling GDM, Using a Blood Glucose Monitor, Blood Glucose Goals, Logging and Interpreting Glucose Results, Ketone Testing, When to Call a Diabetes Educator or OB Provider, Healthy Eating During Pregnancy, Counting Carbohydrates, Meal Planning for GDM, and Physical Activity    Educational materials provided today:   Email material per pt request    Pt verbalized understanding of concepts discussed and recommendations provided today.     PLAN:  Check glucose 4 times daily, before breakfast and 1 hour after each meal.     Check Ketones daily for one week, if negative, reduce testing to once a week.     Physical activity recommended: limited with fibromyalgia.    Meal plan: 30 grams carbs at breakfast, 45-60 grams carbs at lunch, 45-60 grams carbs at supper, 15-30 grams carbs at 3 snacks a day.  Follow consistent CHO meal plan, eat CHO and protein/fat at all meals/snacks.    Call/e-mail/[employer] message diabetes educator  if 3 or more blood sugars are above the goal in 1 week, if ketones are positive, or with questions/concerns.    Time Spent: 30 minutes  Encounter Type: Individual    Any diabetes medication dose changes were made via the CDE Protocol and Collaborative Practice Agreement with the patient's referring care provider. A copy of this encounter was shared with the provider.

## 2022-01-28 ENCOUNTER — VIRTUAL VISIT (OUTPATIENT)
Dept: EDUCATION SERVICES | Facility: CLINIC | Age: 47
End: 2022-01-28
Payer: COMMERCIAL

## 2022-01-28 DIAGNOSIS — O24.419 GESTATIONAL DIABETES: Primary | ICD-10-CM

## 2022-01-28 PROCEDURE — 98968 PH1 ASSMT&MGMT NQHP 21-30: CPT | Mod: TEL | Performed by: DIETITIAN, REGISTERED

## 2022-01-28 NOTE — PATIENT INSTRUCTIONS
Goals for Diabetes Care:    1. Eat balanced meals (3 meals + 3 snacks daily)    Breakfast: 30 grams carbohydrate + Protein  Snack: 15-30 grams carbohydrate + protein  Lunch: 45-60 grams carbohydrate + protein/vegetables  Snack: 15-30 grams Carbohydrate + protein  Dinner: 45-60 grams carbohydrate + protein/vegetables  Bedtime Snack: 15-30 grams + protein    ----Make sure you include protein source with each meal and at bedtime - this has been shown to help with blood glucose elevations    2. Each Morning Check Ketones (small/mod/high - call Diabetes Care Line)  Your goal is negative or trace ketones. If you have ketones in your urine it means you are not eating enough before you go to bed. Eat a larger bedtime snack and include protein.     3. Aim to get at least 30 minutes of activity each day. Activity really helps improve blood sugars.     4. Blood Glucose Targets:   1. Fasting Less than 95 mg/dL   2. 1 hours after a meal target is less than 140 mg/dL  ----Always remember to bring meter and log book to all appointments.      Follow up with your Diabetic Educator as needed to assess BG targets in 2 weeks.  If Blood glucose levels are above normal 3 times or more in one week and you cannot explain them or if you develop small, moderate or high ketones call Diabetes Care at [phone]    Call with any questions.    Thank you,  Thelma Ernst RDN, JUDI, [facility]   Certified Diabetes Care &   [phone]

## 2022-01-28 NOTE — PROGRESS NOTES
Diabetes Self-Management Education & Support    Telephone Visit  9:02a-9:22a    SUBJECTIVE/OBJECTIVE:  Presents for education related to gestational diabetes.    Accompanied by: Self  Diabetes management related comments/concerns: GDM  Gestational weeks: 30 6/7 weeks  Next OB Visit Date: 01/21/22  Had any babies over 9 lbs: No  Previously had Gestational Diabetes: No  Have you ever had thyroid problems or taken thyroid medication?: No  Heart disease, mitral valve prolapse or rheumatic fever?: No  Hypertension : (!) Yes (during pregnancy)  High Cholesterol: No  High Triglycerides: No  Do you use tobacco products?: No  Do you drink beer, wine or hard liquor?: No    Cultural Influences/Ethnic Background:  Not  or       There were no vitals taken for this visit.      Estimated Date of Delivery: Apr 2, 2022    Blood Glucose/Ketone Log:    Date Ketones Fasting Post Breakfast Post Lunch Post Supper   1/28 Trace 5 92 103 -- --   1/27 Negative -- 129 100 93   1/26 Negative 94 88 134 122 *had soda for dinner and ate out   1/25 Negative 102 102 89 133   1/24 15 91 130 -- 107   1/23 Trace 5 96 96 95 97   Nightly snacking- likely ate more for a snack overnight 1/25 and 1/23.  Pt continues to experience severe nausea and has found that eating small amounts frequently helps.  Is drinking mostly water and hydration does not seem to be an issue at this time.  Fruit intake has increased since last visit.  Pt plans to start walking more when the weather warms up.      Lifestyle and Health Behaviors:  Pre-pregnancy weight (lbs): 142  Exercise:: Unable to exercise (trying to walk indoors when she can)  Barrier to exercise: Physical limitation (fibromyalgia pain and heart rate issues)  Cultural/Congregation diet restrictions?: No  Meal planning/habits: Frequent snacking  Meals include: Breakfast,Lunch,Dinner,Morning Snack,Afternoon Snack,Evening Snack  Breakfast: 1 cup of high protein cereal with milk  Lunch: greek yogurt,  lunch meet, cheese sticks  Dinner: Bags of veggies with low carb, high protein rice bowls  Snacks: Lactose free dairy. Drink A2 milk. During the night she has yogurt, yogurt drinks, cheese, dried fruit, canned fruit (diced peaches and cottage cheese), apple or orange  Other: Dealing with hyperemesis - so eats continuously - eats at night too. Does not tolerate eggs, Patient has dairy and gluten sensitivites. High citric acid and some lectin protein intolerance (beans, potatoes, tomatoes)  Beverages: Water,Soda,Diet soda (Regular, but only has 4 ounces, occational diet pepsi)  Biggest challenges to healthy eating: None  Pre- vitamin?: Yes  Supplements?: No  Experiencing nausea?: Yes  Experiencing heartburn?: Yes (occational and mild)    Healthy Coping:  Emotional response to diabetes: Ready to learn  Informal Support system:: Family  Stage of change: ACTION (Actively working towards change)    Current Management:  Taking medications for gestational diabetes?: No  Difficulty affording diabetes medication?: No  Difficulty affording diabetes testing supplies?: No    ASSESSMENT:  Ketones: 1 small.   Fasting blood glucoses: 60% in target.  Fasting BG are not an overnight fasting as pt has severe nausea and eats every 2-3 hours, even during the night.  After breakfast: 100% in target.  After lunch: 100% in target.  After dinner: 100% in target.      INTERVENTION:  Educational topics covered today:  What to expect after delivery, Future testing for Type 2 diabetes (2 hour OGTT at 6 week post-partum check-up and annual fasting blood glucose level), Risk of GDM and planning ahead for future pregnancies, Recommended lifestyle interventions for reducing the risk of Type 2 Diabetes, When to Call a Diabetes Educator or OB Provider    Educational Materials provided today:  [employer]ogi Preventing Diabetes    PLAN:  Check glucose 4 times daily.  Check ketones daily until negative for 7 consistent days.  Then could switch to  weekly.  Continue with recommended physical activity.  Continue to follow recommended meal plan: 2-3 carbs at breakfast, 3-4 carbs at lunch, 3-4 carbs at supper, 1-2 carbs at snacks.  Follow consistent CHO meal plan, eat CHO and protein/fat at all meals/snacks.    Call/e-mail/MyChart message diabetes educator if 3 or more blood sugars are above the goal in 1 week or if ketones are positive.      Time Spent: 22 minutes  Encounter Type: Individual    Any diabetes medication dose changes were made via the CDE Protocol and Collaborative Practice Agreement with the patient's referring provider. A copy of this encounter was shared with the provider.    Natalia Saunders RDN, LD    Thelma Ersnt RDN, LD, [facility]   Certified Diabetes Care &   [phone]

## 2022-01-28 NOTE — LETTER
1/28/2022         RE: Pao Baeza  [address] [place] [address]        Dear Colleague,    Thank you for referring your patient, Pao Baeza, to the [facility]. Please see a copy of my visit note below.    Diabetes Self-Management Education & Support    Telephone Visit  9:02a-9:22a    SUBJECTIVE/OBJECTIVE:  Presents for education related to gestational diabetes.    Accompanied by: Self  Diabetes management related comments/concerns: GDM  Gestational weeks: 30 6/7 weeks  Next OB Visit Date: 01/21/22  Had any babies over 9 lbs: No  Previously had Gestational Diabetes: No  Have you ever had thyroid problems or taken thyroid medication?: No  Heart disease, mitral valve prolapse or rheumatic fever?: No  Hypertension : (!) Yes (during pregnancy)  High Cholesterol: No  High Triglycerides: No  Do you use tobacco products?: No  Do you drink beer, wine or hard liquor?: No    Cultural Influences/Ethnic Background:  Not  or       There were no vitals taken for this visit.      Estimated Date of Delivery: Apr 2, 2022    Blood Glucose/Ketone Log:    Date Ketones Fasting Post Breakfast Post Lunch Post Supper   1/28 Trace 5 92 103 -- --   1/27 Negative -- 129 100 93   1/26 Negative 94 88 134 122 *had soda for dinner and ate out   1/25 Negative 102 102 89 133   1/24 15 91 130 -- 107   1/23 Trace 5 96 96 95 97   Nightly snacking- likely ate more for a snack overnight 1/25 and 1/23.  Pt continues to experience severe nausea and has found that eating small amounts frequently helps.  Is drinking mostly water and hydration does not seem to be an issue at this time.  Fruit intake has increased since last visit.  Pt plans to start walking more when the weather warms up.      Lifestyle and Health Behaviors:  Pre-pregnancy weight (lbs): 142  Exercise:: Unable to exercise (trying to walk indoors when she can)  Barrier to exercise: Physical limitation (fibromyalgia pain and  heart rate issues)  Cultural/Baptism diet restrictions?: No  Meal planning/habits: Frequent snacking  Meals include: Breakfast,Lunch,Dinner,Morning Snack,Afternoon Snack,Evening Snack  Breakfast: 1 cup of high protein cereal with milk  Lunch: greek yogurt, lunch meet, cheese sticks  Dinner: Bags of veggies with low carb, high protein rice bowls  Snacks: Lactose free dairy. Drink A2 milk. During the night she has yogurt, yogurt drinks, cheese, dried fruit, canned fruit (diced peaches and cottage cheese), apple or orange  Other: Dealing with hyperemesis - so eats continuously - eats at night too. Does not tolerate eggs, Patient has dairy and gluten sensitivites. High citric acid and some lectin protein intolerance (beans, potatoes, tomatoes)  Beverages: Water,Soda,Diet soda (Regular, but only has 4 ounces, occational diet pepsi)  Biggest challenges to healthy eating: None  Pre-alena vitamin?: Yes  Supplements?: No  Experiencing nausea?: Yes  Experiencing heartburn?: Yes (occational and mild)    Healthy Coping:  Emotional response to diabetes: Ready to learn  Informal Support system:: Family  Stage of change: ACTION (Actively working towards change)    Current Management:  Taking medications for gestational diabetes?: No  Difficulty affording diabetes medication?: No  Difficulty affording diabetes testing supplies?: No    ASSESSMENT:  Ketones: 1 small.   Fasting blood glucoses: 60% in target.  Fasting BG are not an overnight fasting as pt has severe nausea and eats every 2-3 hours, even during the night.  After breakfast: 100% in target.  After lunch: 100% in target.  After dinner: 100% in target.      INTERVENTION:  Educational topics covered today:  What to expect after delivery, Future testing for Type 2 diabetes (2 hour OGTT at 6 week post-partum check-up and annual fasting blood glucose level), Risk of GDM and planning ahead for future pregnancies, Recommended lifestyle interventions for reducing the risk of  Type 2 Diabetes, When to Call a Diabetes Educator or OB Provider    Educational Materials provided today:  [employer]ogi Preventing Diabetes    PLAN:  Check glucose 4 times daily.  Check ketones daily until negative for 7 consistent days.  Then could switch to weekly.  Continue with recommended physical activity.  Continue to follow recommended meal plan: 2-3 carbs at breakfast, 3-4 carbs at lunch, 3-4 carbs at supper, 1-2 carbs at snacks.  Follow consistent CHO meal plan, eat CHO and protein/fat at all meals/snacks.    Call/e-mail/MyChart message diabetes educator if 3 or more blood sugars are above the goal in 1 week or if ketones are positive.      Time Spent: 22 minutes  Encounter Type: Individual    Any diabetes medication dose changes were made via the CDE Protocol and Collaborative Practice Agreement with the patient's referring provider. A copy of this encounter was shared with the provider.    Natalia Saunders RDN, LD    Thelma Ernst RDN, LD, [facility]   Certified Diabetes Care &   [phone]

## 2022-02-08 ENCOUNTER — TELEPHONE (OUTPATIENT)
Dept: EDUCATION SERVICES | Facility: CLINIC | Age: 47
End: 2022-02-08
Payer: COMMERCIAL

## 2022-02-08 NOTE — TELEPHONE ENCOUNTER
Diabetes Education follow up for GDM    Patient's BG:  Ketones, fasting, 1 hr post breakfast, lunch, dinner  1/30, 5, 92, 137, 123, 110  1/32, 5, 105, 98, 86 - went to hospital for contractions. Didn t do dinner, just snacked.  2/1, 5, 79, 114, 134, - Bad contractions. No dinner, just snacks.  2/2, 5, 96, 91, 118, 88  2/3, 5, 106, 102, 120, 148  2/4, 5, 121, 124, 114, 114  2/5, 5, 94, 117, 105, 95    Left VM with OBGYN Dr. Fierro to discuss morning readings. Patient snacks all night, so the morning fasting readings are more like a 3 hr post meal check.  Fasting goals <95 mg/dL  1 hr post meal goal: <140 mg/dL  2 hr post meal goal: <120 mg/dL    Scheduled follow up with Marleny on Friday and will discuss morning BG more closely and how close to meals the elevated readings are.    Thelma Ernst RDN, LD, [facility]   Certified Diabetes Care &   [phone]

## 2022-02-09 ENCOUNTER — OFFICE VISIT (OUTPATIENT)
Dept: CARDIOLOGY | Facility: CLINIC | Age: 47
End: 2022-02-09
Payer: COMMERCIAL

## 2022-02-09 VITALS
RESPIRATION RATE: 18 BRPM | WEIGHT: 165 LBS | BODY MASS INDEX: 25.84 KG/M2 | HEART RATE: 116 BPM | OXYGEN SATURATION: 96 % | SYSTOLIC BLOOD PRESSURE: 126 MMHG | DIASTOLIC BLOOD PRESSURE: 74 MMHG

## 2022-02-09 DIAGNOSIS — R00.0 TACHYCARDIA: Primary | ICD-10-CM

## 2022-02-09 DIAGNOSIS — R55 SYNCOPE, UNSPECIFIED SYNCOPE TYPE: ICD-10-CM

## 2022-02-09 DIAGNOSIS — R00.2 PALPITATIONS: ICD-10-CM

## 2022-02-09 PROCEDURE — 99204 OFFICE O/P NEW MOD 45 MIN: CPT | Performed by: INTERNAL MEDICINE

## 2022-02-09 RX ORDER — ONDANSETRON 4 MG/1
TABLET, ORALLY DISINTEGRATING ORAL PRN
Status: ON HOLD | COMMUNITY
End: 2022-03-08

## 2022-02-09 RX ORDER — LIDOCAINE 50 MG/G
OINTMENT TOPICAL PRN
COMMUNITY
Start: 2021-10-07

## 2022-02-09 RX ORDER — PROMETHAZINE HYDROCHLORIDE 25 MG/1
TABLET ORAL PRN
COMMUNITY

## 2022-02-09 RX ORDER — HYDROXYZINE PAMOATE 25 MG/1
2 CAPSULE ORAL
Status: ON HOLD | COMMUNITY
Start: 2021-12-15 | End: 2022-03-08

## 2022-02-09 RX ORDER — CYCLOBENZAPRINE HCL 5 MG
10 TABLET ORAL
COMMUNITY
Start: 2000-04-16

## 2022-02-09 RX ORDER — CLOBETASOL PROPIONATE 0.5 MG/G
OINTMENT TOPICAL PRN
Status: ON HOLD | COMMUNITY
Start: 2021-08-03 | End: 2022-03-08

## 2022-02-09 NOTE — LETTER
"2/9/2022    Taylor TY Devine  [facility] [address]  [facility] [address]    RE: Pao Baeza       Dear Colleague,     I had the pleasure of seeing Pao Baeza in the [facility].         [facility]   [address], [place] [address]   [email or web address]   OFFICE: [phone]          Thank you Dr. Ly for asking the [facility] team to participate in the care of your patient, Pao Baeza.     Impression and Plan     1.  Palpitations/tachycardia/fainting.  Marleny reports subjective rapid heart rate.  This has progressed through her pregnancy.  As noted below, she has a long history of fainting spells not necessarily felt cardiogenic in nature.  She has been followed by neurologist in the past.  Clinical exam suggests against any significant structural heart disease.  She did have a Holter monitor and echocardiogram in 2018 both of which were fairly unremarkable.  This, however, was 4 years prior.  Plan:    One month one-patch monitor.    Echocardiogram to exclude any cryptic structural heart disease though not strongly suspected.    Follow-up and further recommendations pending test results.    35 minutes spent reviewing prior records (including documentation, laboratory studies, cardiac testing/imaging), interview with patient along with physical exam, planning, and subsequent documentation/crafting of note.       History of Present Illness    Once again I would like to thank you again for asking me to participate in the care of your patient, Pao Baeza.  As you know, but to reiterate for my own records, Pao Baeza is a 46 year old female with long history of episodes of loss of consciousness.  She had a fairly extensive work-up by cardiologist, Dr.Antoine Brown, In 2018.  Her fainting episodes historically had been preceded by sensation of feeling \"shaky\" and also with some shortness of breath and preceding " "lightheadedness.  She states that commonly, she can abort her symptoms simply by doing some \"biofeedback\".  Episodes of fainting can last 2 minutes to 15 minutes in duration. It was felt by Dr. Brown that her symptoms were likely not cardiogenic in nature.  Her spouse during these episodes has checked her blood pressure and heart rate and both have been normal.  She has also been seen by neurologist in the past.    More recently, however, Marleny has reported subjective increase in her heart rate.  She has noticed this after having become pregnant.  She has not had any fainting spells for approximately 2 months.  Reports no significant chest pain.  Minimizes shortness of breath.    Further review of systems is otherwise negative/noncontributory (medical record and 13 point review of systems reviewed as well and pertinent positives noted).       Cardiac Diagnostics     Echocardiogram 25 June 2018:  1. Normal left ventricular size and systolic performance with ejection fraction greater than 55%.  2. No significant valvular heart disease.  3. Normal right ventricular size and systolic performance.  4. Normal atrial dimensions.    Holter monitor 20 April 2018:  1. Baseline rhythm is normal sinus rhythm  2. Average heart rate is 87 (Range: )  3. No pauses  4. <1% supraventricular burden, no runs  5. <1% ventricular burden, no runs  6. No symptoms reported.     Twelve-lead ECG (personally reviewed) 7 December 2021: Sinus rhythm with heart rate of 86 bpm.  Very mild nonspecific T wave changes.  PAC with aberrancy.           Physical Examination       /74 (BP Location: Left arm, Patient Position: Sitting, Cuff Size: Adult Regular)   Pulse 116   Resp 18   Wt 74.8 kg (165 lb)   SpO2 96%   BMI 25.84 kg/m          Wt Readings from Last 3 Encounters:   02/09/22 74.8 kg (165 lb)   12/07/21 66.7 kg (147 lb)   02/03/21 66.9 kg (147 lb 8 oz)     The patient is alert and oriented times three. Sclerae are anicteric. " Mucosal membranes are moist. Jugular venous pressure is normal. No significant adenopathy/thyromegally appreciated. Lungs are clear with good expansion. On cardiovascular exam, the patient has a regular S1 and S2. Abdomen is soft and non-tender. Extremities reveal no clubbing, cyanosis, or edema.       Family History/Social History/Risk Factors   Patient does not smoke.  Family history reviewed, and family history includes Arthritis in her mother; Dementia in her father; Depression in her father; Fibromyalgia in her mother; Migraines in her mother.        Medical History  Surgical History Family History Social History   Past Medical History:   Diagnosis Date     Hypertension      Migraines      No past surgical history on file.  Family History   Problem Relation Age of Onset     Fibromyalgia Mother      Arthritis Mother      Migraines Mother      Depression Father      Dementia Father         Social History     Socioeconomic History     Marital status:      Spouse name: Not on file     Number of children: Not on file     Years of education: Not on file     Highest education level: Not on file   Occupational History     Not on file   Tobacco Use     Smoking status: Never Smoker     Smokeless tobacco: Never Used   Substance and Sexual Activity     Alcohol use: Not Currently     Drug use: Yes     Comment: CBD oil      Sexual activity: Not Currently   Other Topics Concern     Parent/sibling w/ CABG, MI or angioplasty before 65F 55M? No   Social History Narrative     Not on file     Social Determinants of Health     Financial Resource Strain: Not on file   Food Insecurity: Not on file   Transportation Needs: Not on file   Physical Activity: Not on file   Stress: Not on file   Social Connections: Not on file   Intimate Partner Violence: Not on file   Housing Stability: Not on file           Medications  Allergies   Current Outpatient Medications   Medication Sig Dispense Refill     acetone urine (KETOSTIX) test  strip Check ketones with morning urine daily 50 strip 3     alcohol swab prep pads Use to swab area of injection/slim as directed. 100 each 3     amitriptyline (ELAVIL) 10 MG tablet Take 2 tabs by mouth at bedtime, may take 1-2 tabs during the day if needed.       atenolol (TENORMIN) 50 MG tablet Take 100 mg by mouth daily       blood glucose (NO BRAND SPECIFIED) test strip Use to test blood sugar 4 times daily or as directed. To accompany: Blood Glucose Monitor Brands: per insurance. 100 strip 6     blood glucose monitoring (NO BRAND SPECIFIED) meter device kit Use to test blood sugar 4 times daily or as directed. Preferred blood glucose meter OR supplies to accompany: Blood Glucose Monitor Brands: per insurance. 1 kit 0     butalbital-acetaminophen-caffeine (ESGIC) -40 MG tablet TAKE 2 TABLETS BY MOUTH EVERY 6 HOURS AS NEEDED FOR HEADACHES 60 tablet 5     clobetasol (TEMOVATE) 0.05 % external ointment Apply topically as needed       cyclobenzaprine (FLEXERIL) 5 MG tablet Take 10 mg by mouth nightly as needed       dicyclomine (BENTYL) 20 MG tablet Take 20 mg by mouth 4 times daily       fluticasone (FLONASE) 50 MCG/ACT nasal spray Spray 1-2 sprays in nostril       hydrOXYzine (VISTARIL) 25 MG capsule Take 2 capsules by mouth nightly as needed       lamoTRIgine (LAMICTAL) 25 MG tablet Take 2 tablets (50 mg) by mouth daily 180 tablet 3     lidocaine (XYLOCAINE) 5 % external ointment Apply topically as needed       ondansetron (ZOFRAN-ODT) 4 MG ODT tab Take by mouth as needed       promethazine (PHENERGAN) 25 MG tablet Take by mouth as needed       thin (NO BRAND SPECIFIED) lancets Use with lanceting device. To accompany: Blood Glucose Monitor Brands: per insurance. 100 each 6     desogestrel-ethinyl estradiol (APRI) 0.15-30 MG-MCG tablet Take 1 tablet by mouth       divalproex sodium extended-release (DEPAKOTE ER) 250 MG 24 hr tablet Take 1 tablet (250 mg) by mouth daily MUST CALL TO SCHEDULE FOLLOW UP APPT  90 tablet 3     gabapentin (NEURONTIN) 300 MG capsule Take 600 mg by mouth 3 times daily       Multiple Vitamin (DAILY REGINA) TABS Take 1 tablet by mouth daily       rizatriptan (MAXALT) 10 MG tablet Take 1 tablet (10 mg) by mouth as needed for migraine 12 tablet 11     tiZANidine (ZANAFLEX) 2 MG tablet Take 2 mg by mouth daily as needed         Allergies   Allergen Reactions     Demerol Nausea and Vomiting     Erythromycin      severe vomiting     Gluten Meal      Sulfa Drugs      Other reaction(s): Vomiting     Tetracyclines      vomiting          Lab Results    Chemistry/lipid CBC Cardiac Enzymes/BNP/TSH/INR   No results for input(s): CHOL, HDL, LDL, TRIG, CHOLHDLRATIO in the last 68240 hours.  No results for input(s): LDL in the last 91171 hours.  Recent Labs   Lab Test 12/07/21  1226      POTASSIUM 3.6   CHLORIDE 105   CO2 24   GLC 91   BUN 7*   CR 0.57*   GFRESTIMATED >90   LINDEN 8.6     Recent Labs   Lab Test 12/07/21  1226 04/17/18  1715   CR 0.57* 0.71     No results for input(s): A1C in the last 56614 hours.       Recent Labs   Lab Test 12/07/21  1226   WBC 7.8   HGB 13.1   HCT 40.2   MCV 94        Recent Labs   Lab Test 12/07/21  1226 04/17/18  1715   HGB 13.1 13.9    Recent Labs   Lab Test 12/07/21  1226   TROPONINI <0.01     Recent Labs   Lab Test 12/07/21  1226   BNP 11     Recent Labs   Lab Test 12/07/21  1226   TSH 1.76     No results for input(s): INR in the last 28269 hours.       Medications  Allergies   Current Outpatient Medications   Medication Sig Dispense Refill     acetone urine (KETOSTIX) test strip Check ketones with morning urine daily 50 strip 3     alcohol swab prep pads Use to swab area of injection/slim as directed. 100 each 3     amitriptyline (ELAVIL) 10 MG tablet Take 2 tabs by mouth at bedtime, may take 1-2 tabs during the day if needed.       atenolol (TENORMIN) 50 MG tablet Take 100 mg by mouth daily       blood glucose (NO BRAND SPECIFIED) test strip Use to test  blood sugar 4 times daily or as directed. To accompany: Blood Glucose Monitor Brands: per insurance. 100 strip 6     blood glucose monitoring (NO BRAND SPECIFIED) meter device kit Use to test blood sugar 4 times daily or as directed. Preferred blood glucose meter OR supplies to accompany: Blood Glucose Monitor Brands: per insurance. 1 kit 0     butalbital-acetaminophen-caffeine (ESGIC) -40 MG tablet TAKE 2 TABLETS BY MOUTH EVERY 6 HOURS AS NEEDED FOR HEADACHES 60 tablet 5     clobetasol (TEMOVATE) 0.05 % external ointment Apply topically as needed       cyclobenzaprine (FLEXERIL) 5 MG tablet Take 10 mg by mouth nightly as needed       dicyclomine (BENTYL) 20 MG tablet Take 20 mg by mouth 4 times daily       fluticasone (FLONASE) 50 MCG/ACT nasal spray Spray 1-2 sprays in nostril       hydrOXYzine (VISTARIL) 25 MG capsule Take 2 capsules by mouth nightly as needed       lamoTRIgine (LAMICTAL) 25 MG tablet Take 2 tablets (50 mg) by mouth daily 180 tablet 3     lidocaine (XYLOCAINE) 5 % external ointment Apply topically as needed       ondansetron (ZOFRAN-ODT) 4 MG ODT tab Take by mouth as needed       promethazine (PHENERGAN) 25 MG tablet Take by mouth as needed       thin (NO BRAND SPECIFIED) lancets Use with lanceting device. To accompany: Blood Glucose Monitor Brands: per insurance. 100 each 6     desogestrel-ethinyl estradiol (APRI) 0.15-30 MG-MCG tablet Take 1 tablet by mouth       divalproex sodium extended-release (DEPAKOTE ER) 250 MG 24 hr tablet Take 1 tablet (250 mg) by mouth daily MUST CALL TO SCHEDULE FOLLOW UP APPT 90 tablet 3     gabapentin (NEURONTIN) 300 MG capsule Take 600 mg by mouth 3 times daily       Multiple Vitamin (DAILY REGINA) TABS Take 1 tablet by mouth daily       rizatriptan (MAXALT) 10 MG tablet Take 1 tablet (10 mg) by mouth as needed for migraine 12 tablet 11     tiZANidine (ZANAFLEX) 2 MG tablet Take 2 mg by mouth daily as needed        Allergies   Allergen Reactions     Demerol  Nausea and Vomiting     Erythromycin      severe vomiting     Gluten Meal      Sulfa Drugs      Other reaction(s): Vomiting     Tetracyclines      vomiting        Lab Results   Lab Results   Component Value Date     12/07/2021    CO2 24 12/07/2021    BUN 7 12/07/2021     Lab Results   Component Value Date    WBC 7.8 12/07/2021    HGB 13.1 12/07/2021    HCT 40.2 12/07/2021    MCV 94 12/07/2021     12/07/2021     No results found for: CHOL, TRIG, HDL, LDLDIRECT  No results found for: INR  Lab Results   Component Value Date    BNP 11 12/07/2021     Lab Results   Component Value Date    TROPONINI <0.01 12/07/2021     Lab Results   Component Value Date    TSH 1.76 12/07/2021         Medical History  Surgical History   Past Medical History:   Diagnosis Date     Hypertension      Migraines       No past surgical history on file.                            Thank you for allowing me to participate in the care of your patient.      Sincerely,     Shashi Gagnon MD     [facility]  cc:   Referred Self  No address on file

## 2022-02-09 NOTE — PROGRESS NOTES
"[facility] HEART CARE   [address], [place] [address]   [email or web address]   OFFICE: [phone]          Thank you Dr. Ly for asking the [facility] Heart Care team to participate in the care of your patient, Pao Baeza.     Impression and Plan     1.  Palpitations/tachycardia/fainting.  Marleny reports subjective rapid heart rate.  This has progressed through her pregnancy.  As noted below, she has a long history of fainting spells not necessarily felt cardiogenic in nature.  She has been followed by neurologist in the past.  Clinical exam suggests against any significant structural heart disease.  She did have a Holter monitor and echocardiogram in 2018 both of which were fairly unremarkable.  This, however, was 4 years prior.  Plan:    One month one-patch monitor.    Echocardiogram to exclude any cryptic structural heart disease though not strongly suspected.    Follow-up and further recommendations pending test results.    35 minutes spent reviewing prior records (including documentation, laboratory studies, cardiac testing/imaging), interview with patient along with physical exam, planning, and subsequent documentation/crafting of note.       History of Present Illness    Once again I would like to thank you again for asking me to participate in the care of your patient, Pao Baeza.  As you know, but to reiterate for my own records, Pao Baeza is a 46 year old female with long history of episodes of loss of consciousness.  She had a fairly extensive work-up by cardiologist, Dr.Antoine Brown, In 2018.  Her fainting episodes historically had been preceded by sensation of feeling \"shaky\" and also with some shortness of breath and preceding lightheadedness.  She states that commonly, she can abort her symptoms simply by doing some \"biofeedback\".  Episodes of fainting can last 2 minutes to 15 minutes in duration. It was felt by Dr. Brown that her " symptoms were likely not cardiogenic in nature.  Her spouse during these episodes has checked her blood pressure and heart rate and both have been normal.  She has also been seen by neurologist in the past.    More recently, however, Marleny has reported subjective increase in her heart rate.  She has noticed this after having become pregnant.  She has not had any fainting spells for approximately 2 months.  Reports no significant chest pain.  Minimizes shortness of breath.    Further review of systems is otherwise negative/noncontributory (medical record and 13 point review of systems reviewed as well and pertinent positives noted).       Cardiac Diagnostics     Echocardiogram 25 June 2018:  1. Normal left ventricular size and systolic performance with ejection fraction greater than 55%.  2. No significant valvular heart disease.  3. Normal right ventricular size and systolic performance.  4. Normal atrial dimensions.    Holter monitor 20 April 2018:  1. Baseline rhythm is normal sinus rhythm  2. Average heart rate is 87 (Range: )  3. No pauses  4. <1% supraventricular burden, no runs  5. <1% ventricular burden, no runs  6. No symptoms reported.     Twelve-lead ECG (personally reviewed) 7 December 2021: Sinus rhythm with heart rate of 86 bpm.  Very mild nonspecific T wave changes.  PAC with aberrancy.           Physical Examination       /74 (BP Location: Left arm, Patient Position: Sitting, Cuff Size: Adult Regular)   Pulse 116   Resp 18   Wt 74.8 kg (165 lb)   SpO2 96%   BMI 25.84 kg/m          Wt Readings from Last 3 Encounters:   02/09/22 74.8 kg (165 lb)   12/07/21 66.7 kg (147 lb)   02/03/21 66.9 kg (147 lb 8 oz)     The patient is alert and oriented times three. Sclerae are anicteric. Mucosal membranes are moist. Jugular venous pressure is normal. No significant adenopathy/thyromegally appreciated. Lungs are clear with good expansion. On cardiovascular exam, the patient has a regular S1 and S2.  Abdomen is soft and non-tender. Extremities reveal no clubbing, cyanosis, or edema.       Family History/Social History/Risk Factors   Patient does not smoke.  Family history reviewed, and family history includes Arthritis in her mother; Dementia in her father; Depression in her father; Fibromyalgia in her mother; Migraines in her mother.        Medical History  Surgical History Family History Social History   Past Medical History:   Diagnosis Date     Hypertension      Migraines      No past surgical history on file.  Family History   Problem Relation Age of Onset     Fibromyalgia Mother      Arthritis Mother      Migraines Mother      Depression Father      Dementia Father         Social History     Socioeconomic History     Marital status:      Spouse name: Not on file     Number of children: Not on file     Years of education: Not on file     Highest education level: Not on file   Occupational History     Not on file   Tobacco Use     Smoking status: Never Smoker     Smokeless tobacco: Never Used   Substance and Sexual Activity     Alcohol use: Not Currently     Drug use: Yes     Comment: CBD oil      Sexual activity: Not Currently   Other Topics Concern     Parent/sibling w/ CABG, MI or angioplasty before 65F 55M? No   Social History Narrative     Not on file     Social Determinants of Health     Financial Resource Strain: Not on file   Food Insecurity: Not on file   Transportation Needs: Not on file   Physical Activity: Not on file   Stress: Not on file   Social Connections: Not on file   Intimate Partner Violence: Not on file   Housing Stability: Not on file           Medications  Allergies   Current Outpatient Medications   Medication Sig Dispense Refill     acetone urine (KETOSTIX) test strip Check ketones with morning urine daily 50 strip 3     alcohol swab prep pads Use to swab area of injection/slim as directed. 100 each 3     amitriptyline (ELAVIL) 10 MG tablet Take 2 tabs by mouth at bedtime,  may take 1-2 tabs during the day if needed.       atenolol (TENORMIN) 50 MG tablet Take 100 mg by mouth daily       blood glucose (NO BRAND SPECIFIED) test strip Use to test blood sugar 4 times daily or as directed. To accompany: Blood Glucose Monitor Brands: per insurance. 100 strip 6     blood glucose monitoring (NO BRAND SPECIFIED) meter device kit Use to test blood sugar 4 times daily or as directed. Preferred blood glucose meter OR supplies to accompany: Blood Glucose Monitor Brands: per insurance. 1 kit 0     butalbital-acetaminophen-caffeine (ESGIC) -40 MG tablet TAKE 2 TABLETS BY MOUTH EVERY 6 HOURS AS NEEDED FOR HEADACHES 60 tablet 5     clobetasol (TEMOVATE) 0.05 % external ointment Apply topically as needed       cyclobenzaprine (FLEXERIL) 5 MG tablet Take 10 mg by mouth nightly as needed       dicyclomine (BENTYL) 20 MG tablet Take 20 mg by mouth 4 times daily       fluticasone (FLONASE) 50 MCG/ACT nasal spray Spray 1-2 sprays in nostril       hydrOXYzine (VISTARIL) 25 MG capsule Take 2 capsules by mouth nightly as needed       lamoTRIgine (LAMICTAL) 25 MG tablet Take 2 tablets (50 mg) by mouth daily 180 tablet 3     lidocaine (XYLOCAINE) 5 % external ointment Apply topically as needed       ondansetron (ZOFRAN-ODT) 4 MG ODT tab Take by mouth as needed       promethazine (PHENERGAN) 25 MG tablet Take by mouth as needed       thin (NO BRAND SPECIFIED) lancets Use with lanceting device. To accompany: Blood Glucose Monitor Brands: per insurance. 100 each 6     desogestrel-ethinyl estradiol (APRI) 0.15-30 MG-MCG tablet Take 1 tablet by mouth       divalproex sodium extended-release (DEPAKOTE ER) 250 MG 24 hr tablet Take 1 tablet (250 mg) by mouth daily MUST CALL TO SCHEDULE FOLLOW UP APPT 90 tablet 3     gabapentin (NEURONTIN) 300 MG capsule Take 600 mg by mouth 3 times daily       Multiple Vitamin (DAILY REGINA) TABS Take 1 tablet by mouth daily       rizatriptan (MAXALT) 10 MG tablet Take 1 tablet (10  mg) by mouth as needed for migraine 12 tablet 11     tiZANidine (ZANAFLEX) 2 MG tablet Take 2 mg by mouth daily as needed         Allergies   Allergen Reactions     Demerol Nausea and Vomiting     Erythromycin      severe vomiting     Gluten Meal      Sulfa Drugs      Other reaction(s): Vomiting     Tetracyclines      vomiting          Lab Results    Chemistry/lipid CBC Cardiac Enzymes/BNP/TSH/INR   No results for input(s): CHOL, HDL, LDL, TRIG, CHOLHDLRATIO in the last 55845 hours.  No results for input(s): LDL in the last 18820 hours.  Recent Labs   Lab Test 12/07/21  1226      POTASSIUM 3.6   CHLORIDE 105   CO2 24   GLC 91   BUN 7*   CR 0.57*   GFRESTIMATED >90   LINDEN 8.6     Recent Labs   Lab Test 12/07/21  1226 04/17/18  1715   CR 0.57* 0.71     No results for input(s): A1C in the last 53557 hours.       Recent Labs   Lab Test 12/07/21  1226   WBC 7.8   HGB 13.1   HCT 40.2   MCV 94        Recent Labs   Lab Test 12/07/21  1226 04/17/18  1715   HGB 13.1 13.9    Recent Labs   Lab Test 12/07/21  1226   TROPONINI <0.01     Recent Labs   Lab Test 12/07/21  1226   BNP 11     Recent Labs   Lab Test 12/07/21  1226   TSH 1.76     No results for input(s): INR in the last 30531 hours.       Medications  Allergies   Current Outpatient Medications   Medication Sig Dispense Refill     acetone urine (KETOSTIX) test strip Check ketones with morning urine daily 50 strip 3     alcohol swab prep pads Use to swab area of injection/slim as directed. 100 each 3     amitriptyline (ELAVIL) 10 MG tablet Take 2 tabs by mouth at bedtime, may take 1-2 tabs during the day if needed.       atenolol (TENORMIN) 50 MG tablet Take 100 mg by mouth daily       blood glucose (NO BRAND SPECIFIED) test strip Use to test blood sugar 4 times daily or as directed. To accompany: Blood Glucose Monitor Brands: per insurance. 100 strip 6     blood glucose monitoring (NO BRAND SPECIFIED) meter device kit Use to test blood sugar 4 times daily or  as directed. Preferred blood glucose meter OR supplies to accompany: Blood Glucose Monitor Brands: per insurance. 1 kit 0     butalbital-acetaminophen-caffeine (ESGIC) -40 MG tablet TAKE 2 TABLETS BY MOUTH EVERY 6 HOURS AS NEEDED FOR HEADACHES 60 tablet 5     clobetasol (TEMOVATE) 0.05 % external ointment Apply topically as needed       cyclobenzaprine (FLEXERIL) 5 MG tablet Take 10 mg by mouth nightly as needed       dicyclomine (BENTYL) 20 MG tablet Take 20 mg by mouth 4 times daily       fluticasone (FLONASE) 50 MCG/ACT nasal spray Spray 1-2 sprays in nostril       hydrOXYzine (VISTARIL) 25 MG capsule Take 2 capsules by mouth nightly as needed       lamoTRIgine (LAMICTAL) 25 MG tablet Take 2 tablets (50 mg) by mouth daily 180 tablet 3     lidocaine (XYLOCAINE) 5 % external ointment Apply topically as needed       ondansetron (ZOFRAN-ODT) 4 MG ODT tab Take by mouth as needed       promethazine (PHENERGAN) 25 MG tablet Take by mouth as needed       thin (NO BRAND SPECIFIED) lancets Use with lanceting device. To accompany: Blood Glucose Monitor Brands: per insurance. 100 each 6     desogestrel-ethinyl estradiol (APRI) 0.15-30 MG-MCG tablet Take 1 tablet by mouth       divalproex sodium extended-release (DEPAKOTE ER) 250 MG 24 hr tablet Take 1 tablet (250 mg) by mouth daily MUST CALL TO SCHEDULE FOLLOW UP APPT 90 tablet 3     gabapentin (NEURONTIN) 300 MG capsule Take 600 mg by mouth 3 times daily       Multiple Vitamin (DAILY REGINA) TABS Take 1 tablet by mouth daily       rizatriptan (MAXALT) 10 MG tablet Take 1 tablet (10 mg) by mouth as needed for migraine 12 tablet 11     tiZANidine (ZANAFLEX) 2 MG tablet Take 2 mg by mouth daily as needed        Allergies   Allergen Reactions     Demerol Nausea and Vomiting     Erythromycin      severe vomiting     Gluten Meal      Sulfa Drugs      Other reaction(s): Vomiting     Tetracyclines      vomiting        Lab Results   Lab Results   Component Value Date      12/07/2021    CO2 24 12/07/2021    BUN 7 12/07/2021     Lab Results   Component Value Date    WBC 7.8 12/07/2021    HGB 13.1 12/07/2021    HCT 40.2 12/07/2021    MCV 94 12/07/2021     12/07/2021     No results found for: CHOL, TRIG, HDL, LDLDIRECT  No results found for: INR  Lab Results   Component Value Date    BNP 11 12/07/2021     Lab Results   Component Value Date    TROPONINI <0.01 12/07/2021     Lab Results   Component Value Date    TSH 1.76 12/07/2021         Medical History  Surgical History   Past Medical History:   Diagnosis Date     Hypertension      Migraines       No past surgical history on file.

## 2022-02-11 ENCOUNTER — VIRTUAL VISIT (OUTPATIENT)
Dept: EDUCATION SERVICES | Facility: CLINIC | Age: 47
End: 2022-02-11
Payer: COMMERCIAL

## 2022-02-11 DIAGNOSIS — O24.419 GESTATIONAL DIABETES: Primary | ICD-10-CM

## 2022-02-11 PROCEDURE — 98967 PH1 ASSMT&MGMT NQHP 11-20: CPT | Mod: TEL | Performed by: DIETITIAN, REGISTERED

## 2022-02-11 NOTE — PATIENT INSTRUCTIONS
Goals for Diabetes Care:    1. Eat balanced meals (3 meals + 3 snacks daily)    Breakfast: 30 grams carbohydrate + Protein  Snack: 15-30 grams carbohydrate + protein  Lunch: 45-60 grams carbohydrate + protein/vegetables  Snack: 15-30 grams Carbohydrate + protein  Dinner: 45-60 grams carbohydrate + protein/vegetables  Bedtime Snack: 15-30 grams + protein    ----Make sure you include protein source with each meal and at bedtime - this has been shown to help with blood glucose elevations    2.  Check Ketones once weekly (small/mod/high - call Diabetes Care Line)  Your goal is negative or trace ketones. If you have ketones in your urine it means you are not eating enough before you go to bed. Eat a larger bedtime snack and include protein.     3. Aim to get at least 30 minutes of activity each day. Activity really helps improve blood sugars.     4. Blood Glucose Targets:   1. Fasting Less than 95 mg/dL   2. 1 hours after a meal target is less than 140 mg/dL  ----Always remember to bring meter and log book to all appointments.    Follow up with your Diabetic Educator as needed to assess BG targets in 1-2 weeks via mychart with BG logs.  If Blood glucose levels are above normal 3 times or more in one week and you cannot explain them or if you develop small, moderate or high ketones call Diabetes Care at [phone]    Call with any questions.    Thank you,  Thelma Ernst RDN, LD, [facility]   Certified Diabetes Care &   [phone]

## 2022-02-11 NOTE — PROGRESS NOTES
Diabetes Self-Management Education & Support  Video visit changed to telephone visit due to audio issues. 16 minutes.    SUBJECTIVE/OBJECTIVE:  Presents for education related to gestational diabetes.    Accompanied by: Self  Diabetes management related comments/concerns: GDM  Gestational weeks: 32 6/7 weeks  Hospital planned for delivery: [facility]  Next OB Visit Date: 03/11/22  Had any babies over 9 lbs: No  Previously had Gestational Diabetes: No  Have you ever had thyroid problems or taken thyroid medication?: No  Heart disease, mitral valve prolapse or rheumatic fever?: No  Hypertension : (!) Yes (during pregnancy)  High Cholesterol: No  High Triglycerides: No  Do you use tobacco products?: No  Do you drink beer, wine or hard liquor?: No    Cultural Influences/Ethnic Background:  Not  or     There were no vitals taken for this visit.      Estimated Date of Delivery: Apr 2, 2022    Blood Glucose/Ketone Log:    Date Ketones Fasting Post Breakfast Post Lunch Post Supper   2/11 5 102 - 2 hrs after eating 94 119 --   2/10 5 94 95 96 --   2/9 5 94 166-[employer] 100 119   2/8 5 91 96 100 118   2/7 5 91 97 94 121   2/6 5 84 92 136 117   - - - - - -     Trying to be more aware of what she is eating. Eating really small frequent meals - every 2 hrs, even at night. Typically only eating 30 grams of carbs at a sitting.   Fasting numbers are not truly fasting with snacking. Reviewed BG goals of 1 hr post meal 140 mg/dL or less and 2 hrs post meal 120 mg/dL or less.    Lifestyle and Health Behaviors:  Pre-pregnancy weight (lbs): 142  Exercise:: Unable to exercise (trying to walk indoors when she can)  Barrier to exercise: Physical limitation (fibromyalgia pain and heart rate issues)  Cultural/Sikh diet restrictions?: No  Meal planning/habits: Frequent snacking  Meals include: Breakfast,Lunch,Dinner,Morning Snack,Afternoon Snack,Evening Snack  Breakfast: 1 cup of high protein cereal with milk  Lunch:  greek yogurt, lunch meet, cheese sticks  Dinner: Bags of veggies with low carb, high protein rice bowls  Snacks: Lactose free dairy. Drink A2 milk. During the night she has yogurt, yogurt drinks, cheese, dried fruit, canned fruit (diced peaches and cottage cheese), apple or orange  Other: Dealing with hyperemesis - so eats continuously - eats at night too. Does not tolerate eggs, Patient has dairy and gluten sensitivites. High citric acid and some lectin protein intolerance (beans, potatoes, tomatoes)  Beverages: Water,Soda,Diet soda (Regular, but only has 4 ounces, occational diet pepsi)  Biggest challenges to healthy eating: None  Pre- vitamin?: Yes  Supplements?: No  Experiencing nausea?: Yes  Experiencing heartburn?: Yes (occational and mild)    Healthy Coping:  Emotional response to diabetes: Ready to learn  Informal Support system:: Family  Stage of change: ACTION (Actively working towards change)    Current Management:  Taking medications for gestational diabetes?: No  Difficulty affording diabetes medication?: No  Difficulty affording diabetes testing supplies?: No    ASSESSMENT:  Ketones: 100.   Fasting blood glucoses: 100% in target.  After breakfast: 83% in target.  After lunch: 100% in target.  After dinner: 100% in target.    Patient is doing well - still struggling with hyperemesis and nausea. Reviewed small frequent meals and carb goals. Also reviewed BG goals.    INTERVENTION:  Educational topics covered today:  What to expect after delivery, Future testing for Type 2 diabetes (2 hour OGTT at 6 week post-partum check-up and annual fasting blood glucose level), Risk of GDM and planning ahead for future pregnancies, Recommended lifestyle interventions for reducing the risk of Type 2 Diabetes, When to Call a Diabetes Educator or OB Provider    Educational Materials provided today:  [employer] Preventing Diabetes    PLAN:  Check glucose 4 times daily.  Check ketones once a week when readings are  consistently negative.  Continue with recommended physical activity.  Continue to follow recommended meal plan: 30 grams carbs at breakfast, 45-60 grams carbs at lunch, 45-60 grams carbs at supper, 30 grams carbs at snacks.  Follow consistent CHO meal plan, eat CHO and protein/fat at all meals/snacks.    Call/e-mail/Adypehart message diabetes educator if 3 or more blood sugars are above the goal in 1 week or if ketones are positive.    Time Spent: 15 minutes  Encounter Type: Individual    Any diabetes medication dose changes were made via the CDE Protocol and Collaborative Practice Agreement with the patient's referring provider. A copy of this encounter was shared with the provider.

## 2022-02-11 NOTE — LETTER
2/11/2022         RE: Pao Baeza  [address] [place] [address]        Dear Colleague,    Thank you for referring your patient, Pao Baeza, to the [facility]. Please see a copy of my visit note below.    Diabetes Self-Management Education & Support  Video visit changed to telephone visit due to audio issues. 16 minutes.    SUBJECTIVE/OBJECTIVE:  Presents for education related to gestational diabetes.    Accompanied by: Self  Diabetes management related comments/concerns: GDM  Gestational weeks: 32 6/7 weeks  Hospital planned for delivery: [facility]  Next OB Visit Date: 03/11/22  Had any babies over 9 lbs: No  Previously had Gestational Diabetes: No  Have you ever had thyroid problems or taken thyroid medication?: No  Heart disease, mitral valve prolapse or rheumatic fever?: No  Hypertension : (!) Yes (during pregnancy)  High Cholesterol: No  High Triglycerides: No  Do you use tobacco products?: No  Do you drink beer, wine or hard liquor?: No    Cultural Influences/Ethnic Background:  Not  or     There were no vitals taken for this visit.      Estimated Date of Delivery: Apr 2, 2022    Blood Glucose/Ketone Log:    Date Ketones Fasting Post Breakfast Post Lunch Post Supper   2/11 5 102 - 2 hrs after eating 94 119 --   2/10 5 94 95 96 --   2/9 5 94 166-[employer] 100 119   2/8 5 91 96 100 118   2/7 5 91 97 94 121   2/6 5 84 92 136 117   - - - - - -     Trying to be more aware of what she is eating. Eating really small frequent meals - every 2 hrs, even at night. Typically only eating 30 grams of carbs at a sitting.   Fasting numbers are not truly fasting with snacking. Reviewed BG goals of 1 hr post meal 140 mg/dL or less and 2 hrs post meal 120 mg/dL or less.    Lifestyle and Health Behaviors:  Pre-pregnancy weight (lbs): 142  Exercise:: Unable to exercise (trying to walk indoors when she can)  Barrier to exercise: Physical limitation  (fibromyalgia pain and heart rate issues)  Cultural/Yazdanism diet restrictions?: No  Meal planning/habits: Frequent snacking  Meals include: Breakfast,Lunch,Dinner,Morning Snack,Afternoon Snack,Evening Snack  Breakfast: 1 cup of high protein cereal with milk  Lunch: greek yogurt, lunch meet, cheese sticks  Dinner: Bags of veggies with low carb, high protein rice bowls  Snacks: Lactose free dairy. Drink A2 milk. During the night she has yogurt, yogurt drinks, cheese, dried fruit, canned fruit (diced peaches and cottage cheese), apple or orange  Other: Dealing with hyperemesis - so eats continuously - eats at night too. Does not tolerate eggs, Patient has dairy and gluten sensitivites. High citric acid and some lectin protein intolerance (beans, potatoes, tomatoes)  Beverages: Water,Soda,Diet soda (Regular, but only has 4 ounces, occational diet pepsi)  Biggest challenges to healthy eating: None  Pre- vitamin?: Yes  Supplements?: No  Experiencing nausea?: Yes  Experiencing heartburn?: Yes (occational and mild)    Healthy Coping:  Emotional response to diabetes: Ready to learn  Informal Support system:: Family  Stage of change: ACTION (Actively working towards change)    Current Management:  Taking medications for gestational diabetes?: No  Difficulty affording diabetes medication?: No  Difficulty affording diabetes testing supplies?: No    ASSESSMENT:  Ketones: 100.   Fasting blood glucoses: 100% in target.  After breakfast: 83% in target.  After lunch: 100% in target.  After dinner: 100% in target.    Patient is doing well - still struggling with hyperemesis and nausea. Reviewed small frequent meals and carb goals. Also reviewed BG goals.    INTERVENTION:  Educational topics covered today:  What to expect after delivery, Future testing for Type 2 diabetes (2 hour OGTT at 6 week post-partum check-up and annual fasting blood glucose level), Risk of GDM and planning ahead for future pregnancies, Recommended  lifestyle interventions for reducing the risk of Type 2 Diabetes, When to Call a Diabetes Educator or OB Provider    Educational Materials provided today:  [employer] Preventing Diabetes    PLAN:  Check glucose 4 times daily.  Check ketones once a week when readings are consistently negative.  Continue with recommended physical activity.  Continue to follow recommended meal plan: 30 grams carbs at breakfast, 45-60 grams carbs at lunch, 45-60 grams carbs at supper, 30 grams carbs at snacks.  Follow consistent CHO meal plan, eat CHO and protein/fat at all meals/snacks.    Call/e-mail/MyChart message diabetes educator if 3 or more blood sugars are above the goal in 1 week or if ketones are positive.    Time Spent: 15 minutes  Encounter Type: Individual    Any diabetes medication dose changes were made via the CDE Protocol and Collaborative Practice Agreement with the patient's referring provider. A copy of this encounter was shared with the provider.

## 2022-02-18 ENCOUNTER — HOSPITAL ENCOUNTER (OUTPATIENT)
Dept: CARDIOLOGY | Facility: CLINIC | Age: 47
End: 2022-02-18
Attending: INTERNAL MEDICINE
Payer: COMMERCIAL

## 2022-02-18 DIAGNOSIS — R00.0 TACHYCARDIA: ICD-10-CM

## 2022-02-18 LAB — LVEF ECHO: NORMAL

## 2022-02-18 PROCEDURE — 93306 TTE W/DOPPLER COMPLETE: CPT | Mod: 26 | Performed by: INTERNAL MEDICINE

## 2022-02-18 PROCEDURE — 93306 TTE W/DOPPLER COMPLETE: CPT

## 2022-02-18 PROCEDURE — 93270 REMOTE 30 DAY ECG REV/REPORT: CPT

## 2022-02-21 DIAGNOSIS — Z11.59 ENCOUNTER FOR SCREENING FOR OTHER VIRAL DISEASES: Primary | ICD-10-CM

## 2022-02-21 PROCEDURE — 93272 ECG/REVIEW INTERPRET ONLY: CPT | Performed by: INTERNAL MEDICINE

## 2022-02-25 ENCOUNTER — TELEPHONE (OUTPATIENT)
Dept: NEUROLOGY | Facility: CLINIC | Age: 47
End: 2022-02-25
Payer: COMMERCIAL

## 2022-02-25 DIAGNOSIS — G43.019 INTRACTABLE MIGRAINE WITHOUT AURA AND WITHOUT STATUS MIGRAINOSUS: ICD-10-CM

## 2022-02-25 RX ORDER — LAMOTRIGINE 25 MG/1
50 TABLET ORAL DAILY
Qty: 28 TABLET | Refills: 0 | Status: SHIPPED | OUTPATIENT
Start: 2022-02-25

## 2022-02-25 NOTE — TELEPHONE ENCOUNTER
Will deny 90 day request as pt has not been seen and needs an appointment for follow up.     Gladys Mckinney RN on 2/25/2022 at 11:26 AM

## 2022-03-05 ENCOUNTER — ANESTHESIA (OUTPATIENT)
Dept: OBGYN | Facility: HOSPITAL | Age: 47
End: 2022-03-05
Payer: COMMERCIAL

## 2022-03-05 ENCOUNTER — HOSPITAL ENCOUNTER (INPATIENT)
Facility: HOSPITAL | Age: 47
LOS: 3 days | Discharge: HOME | End: 2022-03-08
Attending: OBSTETRICS & GYNECOLOGY | Admitting: OBSTETRICS & GYNECOLOGY
Payer: COMMERCIAL

## 2022-03-05 ENCOUNTER — ANESTHESIA EVENT (OUTPATIENT)
Dept: OBGYN | Facility: HOSPITAL | Age: 47
End: 2022-03-05
Payer: COMMERCIAL

## 2022-03-05 DIAGNOSIS — Z98.891 S/P CESAREAN SECTION: Primary | ICD-10-CM

## 2022-03-05 DIAGNOSIS — I10 HYPERTENSION, UNSPECIFIED TYPE: ICD-10-CM

## 2022-03-05 PROBLEM — Z36.89 ENCOUNTER FOR TRIAGE IN PREGNANT PATIENT: Status: ACTIVE | Noted: 2022-03-05

## 2022-03-05 LAB
ABO/RH(D): NORMAL
ANTIBODY SCREEN: NEGATIVE
GLUCOSE BLDC GLUCOMTR-MCNC: 85 MG/DL (ref 70–99)
HGB BLD-MCNC: 13.2 G/DL (ref 11.7–15.7)
SARS-COV-2 RNA RESP QL NAA+PROBE: NEGATIVE
SPECIMEN EXPIRATION DATE: NORMAL

## 2022-03-05 PROCEDURE — 250N000011 HC RX IP 250 OP 636: Performed by: NURSE ANESTHETIST, CERTIFIED REGISTERED

## 2022-03-05 PROCEDURE — 999N000249 HC STATISTIC C-SECTION ON UNIT: Performed by: OBSTETRICS & GYNECOLOGY

## 2022-03-05 PROCEDURE — 250N000013 HC RX MED GY IP 250 OP 250 PS 637: Performed by: NURSE PRACTITIONER

## 2022-03-05 PROCEDURE — 250N000013 HC RX MED GY IP 250 OP 250 PS 637: Performed by: OBSTETRICS & GYNECOLOGY

## 2022-03-05 PROCEDURE — 250N000011 HC RX IP 250 OP 636: Performed by: OBSTETRICS & GYNECOLOGY

## 2022-03-05 PROCEDURE — 82962 GLUCOSE BLOOD TEST: CPT

## 2022-03-05 PROCEDURE — 250N000009 HC RX 250: Performed by: NURSE ANESTHETIST, CERTIFIED REGISTERED

## 2022-03-05 PROCEDURE — 250N000011 HC RX IP 250 OP 636: Performed by: NURSE PRACTITIONER

## 2022-03-05 PROCEDURE — 360N000076 HC SURGERY LEVEL 3, PER MIN: Performed by: OBSTETRICS & GYNECOLOGY

## 2022-03-05 PROCEDURE — 86780 TREPONEMA PALLIDUM: CPT | Performed by: NURSE PRACTITIONER

## 2022-03-05 PROCEDURE — 272N000001 HC OR GENERAL SUPPLY STERILE: Performed by: OBSTETRICS & GYNECOLOGY

## 2022-03-05 PROCEDURE — 370N000017 HC ANESTHESIA TECHNICAL FEE, PER MIN: Performed by: OBSTETRICS & GYNECOLOGY

## 2022-03-05 PROCEDURE — 85018 HEMOGLOBIN: CPT | Performed by: NURSE PRACTITIONER

## 2022-03-05 PROCEDURE — 120N000001 HC R&B MED SURG/OB

## 2022-03-05 PROCEDURE — 258N000003 HC RX IP 258 OP 636: Performed by: NURSE ANESTHETIST, CERTIFIED REGISTERED

## 2022-03-05 PROCEDURE — 258N000003 HC RX IP 258 OP 636: Performed by: ANESTHESIOLOGY

## 2022-03-05 PROCEDURE — 36415 COLL VENOUS BLD VENIPUNCTURE: CPT | Performed by: NURSE PRACTITIONER

## 2022-03-05 PROCEDURE — 86850 RBC ANTIBODY SCREEN: CPT | Performed by: NURSE PRACTITIONER

## 2022-03-05 PROCEDURE — 86901 BLOOD TYPING SEROLOGIC RH(D): CPT | Performed by: NURSE PRACTITIONER

## 2022-03-05 PROCEDURE — 88302 TISSUE EXAM BY PATHOLOGIST: CPT | Mod: TC | Performed by: OBSTETRICS & GYNECOLOGY

## 2022-03-05 PROCEDURE — 999N000249 HC STATISTIC C-SECTION ON UNIT

## 2022-03-05 PROCEDURE — 0UB70ZZ EXCISION OF BILATERAL FALLOPIAN TUBES, OPEN APPROACH: ICD-10-PCS | Performed by: OBSTETRICS & GYNECOLOGY

## 2022-03-05 PROCEDURE — 96372 THER/PROPH/DIAG INJ SC/IM: CPT | Performed by: OBSTETRICS & GYNECOLOGY

## 2022-03-05 PROCEDURE — 250N000011 HC RX IP 250 OP 636: Performed by: ANESTHESIOLOGY

## 2022-03-05 PROCEDURE — 87635 SARS-COV-2 COVID-19 AMP PRB: CPT | Performed by: OBSTETRICS & GYNECOLOGY

## 2022-03-05 RX ORDER — PRENATAL VIT/IRON FUM/FOLIC AC 27MG-0.8MG
1 TABLET ORAL DAILY
Status: DISCONTINUED | OUTPATIENT
Start: 2022-03-05 | End: 2022-03-08 | Stop reason: HOSPADM

## 2022-03-05 RX ORDER — KETOROLAC TROMETHAMINE 30 MG/ML
30 INJECTION, SOLUTION INTRAMUSCULAR; INTRAVENOUS EVERY 6 HOURS
Status: COMPLETED | OUTPATIENT
Start: 2022-03-05 | End: 2022-03-06

## 2022-03-05 RX ORDER — CEFAZOLIN SODIUM 2 G/100ML
2 INJECTION, SOLUTION INTRAVENOUS
Status: COMPLETED | OUTPATIENT
Start: 2022-03-05 | End: 2022-03-05

## 2022-03-05 RX ORDER — CARBOPROST TROMETHAMINE 250 UG/ML
250 INJECTION, SOLUTION INTRAMUSCULAR
Status: DISCONTINUED | OUTPATIENT
Start: 2022-03-05 | End: 2022-03-08 | Stop reason: HOSPADM

## 2022-03-05 RX ORDER — FENTANYL CITRATE 50 UG/ML
25 INJECTION, SOLUTION INTRAMUSCULAR; INTRAVENOUS EVERY 5 MIN PRN
Status: DISCONTINUED | OUTPATIENT
Start: 2022-03-05 | End: 2022-03-08 | Stop reason: HOSPADM

## 2022-03-05 RX ORDER — LIDOCAINE 40 MG/G
CREAM TOPICAL
Status: DISCONTINUED | OUTPATIENT
Start: 2022-03-05 | End: 2022-03-05 | Stop reason: HOSPADM

## 2022-03-05 RX ORDER — SODIUM CHLORIDE, SODIUM LACTATE, POTASSIUM CHLORIDE, CALCIUM CHLORIDE 600; 310; 30; 20 MG/100ML; MG/100ML; MG/100ML; MG/100ML
INJECTION, SOLUTION INTRAVENOUS CONTINUOUS
Status: DISCONTINUED | OUTPATIENT
Start: 2022-03-05 | End: 2022-03-08 | Stop reason: HOSPADM

## 2022-03-05 RX ORDER — ONDANSETRON 2 MG/ML
INJECTION INTRAMUSCULAR; INTRAVENOUS PRN
Status: DISCONTINUED | OUTPATIENT
Start: 2022-03-05 | End: 2022-03-05

## 2022-03-05 RX ORDER — ACETAMINOPHEN 325 MG/1
975 TABLET ORAL EVERY 6 HOURS
Status: DISCONTINUED | OUTPATIENT
Start: 2022-03-05 | End: 2022-03-07

## 2022-03-05 RX ORDER — ACETAMINOPHEN 325 MG/1
975 TABLET ORAL ONCE
Status: COMPLETED | OUTPATIENT
Start: 2022-03-05 | End: 2022-03-05

## 2022-03-05 RX ORDER — AMITRIPTYLINE HYDROCHLORIDE 10 MG/1
10 TABLET ORAL AT BEDTIME
Status: DISCONTINUED | OUTPATIENT
Start: 2022-03-05 | End: 2022-03-05

## 2022-03-05 RX ORDER — OXYTOCIN/0.9 % SODIUM CHLORIDE 30/500 ML
PLASTIC BAG, INJECTION (ML) INTRAVENOUS CONTINUOUS PRN
Status: DISCONTINUED | OUTPATIENT
Start: 2022-03-05 | End: 2022-03-05

## 2022-03-05 RX ORDER — MISOPROSTOL 200 UG/1
400 TABLET ORAL
Status: DISCONTINUED | OUTPATIENT
Start: 2022-03-05 | End: 2022-03-05 | Stop reason: HOSPADM

## 2022-03-05 RX ORDER — FENTANYL CITRATE-0.9 % NACL/PF 10 MCG/ML
100 PLASTIC BAG, INJECTION (ML) INTRAVENOUS EVERY 5 MIN PRN
Status: DISCONTINUED | OUTPATIENT
Start: 2022-03-05 | End: 2022-03-05 | Stop reason: HOSPADM

## 2022-03-05 RX ORDER — CITRIC ACID/SODIUM CITRATE 334-500MG
30 SOLUTION, ORAL ORAL
Status: COMPLETED | OUTPATIENT
Start: 2022-03-05 | End: 2022-03-05

## 2022-03-05 RX ORDER — OXYCODONE HYDROCHLORIDE 5 MG/1
5 TABLET ORAL EVERY 4 HOURS PRN
Status: DISCONTINUED | OUTPATIENT
Start: 2022-03-05 | End: 2022-03-07

## 2022-03-05 RX ORDER — NALOXONE HYDROCHLORIDE 0.4 MG/ML
0.2 INJECTION, SOLUTION INTRAMUSCULAR; INTRAVENOUS; SUBCUTANEOUS
Status: DISCONTINUED | OUTPATIENT
Start: 2022-03-05 | End: 2022-03-08 | Stop reason: HOSPADM

## 2022-03-05 RX ORDER — OXYTOCIN/0.9 % SODIUM CHLORIDE 30/500 ML
100-340 PLASTIC BAG, INJECTION (ML) INTRAVENOUS CONTINUOUS PRN
Status: DISCONTINUED | OUTPATIENT
Start: 2022-03-05 | End: 2022-03-05 | Stop reason: HOSPADM

## 2022-03-05 RX ORDER — ONDANSETRON 2 MG/ML
4 INJECTION INTRAMUSCULAR; INTRAVENOUS EVERY 6 HOURS PRN
Status: DISCONTINUED | OUTPATIENT
Start: 2022-03-05 | End: 2022-03-05 | Stop reason: HOSPADM

## 2022-03-05 RX ORDER — MISOPROSTOL 200 UG/1
800 TABLET ORAL
Status: DISCONTINUED | OUTPATIENT
Start: 2022-03-05 | End: 2022-03-05 | Stop reason: HOSPADM

## 2022-03-05 RX ORDER — ASPIRIN 81 MG/1
81 TABLET ORAL DAILY
Status: ON HOLD | COMMUNITY
End: 2022-03-08

## 2022-03-05 RX ORDER — METOCLOPRAMIDE 10 MG/1
10 TABLET ORAL EVERY 6 HOURS PRN
Status: DISCONTINUED | OUTPATIENT
Start: 2022-03-05 | End: 2022-03-08 | Stop reason: HOSPADM

## 2022-03-05 RX ORDER — OXYTOCIN 10 [USP'U]/ML
10 INJECTION, SOLUTION INTRAMUSCULAR; INTRAVENOUS
Status: DISCONTINUED | OUTPATIENT
Start: 2022-03-05 | End: 2022-03-08 | Stop reason: HOSPADM

## 2022-03-05 RX ORDER — MORPHINE SULFATE 1 MG/ML
INJECTION, SOLUTION EPIDURAL; INTRATHECAL; INTRAVENOUS
Status: COMPLETED | OUTPATIENT
Start: 2022-03-05 | End: 2022-03-05

## 2022-03-05 RX ORDER — PROCHLORPERAZINE 25 MG
25 SUPPOSITORY, RECTAL RECTAL EVERY 12 HOURS PRN
Status: DISCONTINUED | OUTPATIENT
Start: 2022-03-05 | End: 2022-03-08 | Stop reason: HOSPADM

## 2022-03-05 RX ORDER — ONDANSETRON 4 MG/1
4 TABLET, ORALLY DISINTEGRATING ORAL EVERY 6 HOURS PRN
Status: DISCONTINUED | OUTPATIENT
Start: 2022-03-05 | End: 2022-03-05 | Stop reason: HOSPADM

## 2022-03-05 RX ORDER — GLYCOPYRROLATE 0.2 MG/ML
INJECTION, SOLUTION INTRAMUSCULAR; INTRAVENOUS PRN
Status: DISCONTINUED | OUTPATIENT
Start: 2022-03-05 | End: 2022-03-05

## 2022-03-05 RX ORDER — CARBOPROST TROMETHAMINE 250 UG/ML
250 INJECTION, SOLUTION INTRAMUSCULAR
Status: DISCONTINUED | OUTPATIENT
Start: 2022-03-05 | End: 2022-03-05 | Stop reason: HOSPADM

## 2022-03-05 RX ORDER — MISOPROSTOL 200 UG/1
800 TABLET ORAL
Status: DISCONTINUED | OUTPATIENT
Start: 2022-03-05 | End: 2022-03-08 | Stop reason: HOSPADM

## 2022-03-05 RX ORDER — OXYTOCIN/0.9 % SODIUM CHLORIDE 30/500 ML
340 PLASTIC BAG, INJECTION (ML) INTRAVENOUS CONTINUOUS PRN
Status: DISCONTINUED | OUTPATIENT
Start: 2022-03-05 | End: 2022-03-08 | Stop reason: HOSPADM

## 2022-03-05 RX ORDER — AMITRIPTYLINE HYDROCHLORIDE 10 MG/1
20 TABLET ORAL AT BEDTIME
Status: DISCONTINUED | OUTPATIENT
Start: 2022-03-05 | End: 2022-03-05

## 2022-03-05 RX ORDER — EPHEDRINE SULFATE 50 MG/ML
INJECTION, SOLUTION INTRAMUSCULAR; INTRAVENOUS; SUBCUTANEOUS PRN
Status: DISCONTINUED | OUTPATIENT
Start: 2022-03-05 | End: 2022-03-05

## 2022-03-05 RX ORDER — MODIFIED LANOLIN
OINTMENT (GRAM) TOPICAL
Status: DISCONTINUED | OUTPATIENT
Start: 2022-03-05 | End: 2022-03-08 | Stop reason: HOSPADM

## 2022-03-05 RX ORDER — NALBUPHINE HYDROCHLORIDE 10 MG/ML
2.5-5 INJECTION, SOLUTION INTRAMUSCULAR; INTRAVENOUS; SUBCUTANEOUS EVERY 6 HOURS PRN
Status: DISCONTINUED | OUTPATIENT
Start: 2022-03-05 | End: 2022-03-08 | Stop reason: HOSPADM

## 2022-03-05 RX ORDER — SODIUM CHLORIDE, SODIUM LACTATE, POTASSIUM CHLORIDE, CALCIUM CHLORIDE 600; 310; 30; 20 MG/100ML; MG/100ML; MG/100ML; MG/100ML
INJECTION, SOLUTION INTRAVENOUS CONTINUOUS
Status: DISCONTINUED | OUTPATIENT
Start: 2022-03-05 | End: 2022-03-05 | Stop reason: HOSPADM

## 2022-03-05 RX ORDER — METHYLERGONOVINE MALEATE 0.2 MG/ML
200 INJECTION INTRAVENOUS
Status: DISCONTINUED | OUTPATIENT
Start: 2022-03-05 | End: 2022-03-05 | Stop reason: HOSPADM

## 2022-03-05 RX ORDER — METHYLERGONOVINE MALEATE 0.2 MG/ML
200 INJECTION INTRAVENOUS
Status: DISCONTINUED | OUTPATIENT
Start: 2022-03-05 | End: 2022-03-08 | Stop reason: HOSPADM

## 2022-03-05 RX ORDER — TERBUTALINE SULFATE 1 MG/ML
0.25 INJECTION, SOLUTION SUBCUTANEOUS ONCE
Status: COMPLETED | OUTPATIENT
Start: 2022-03-05 | End: 2022-03-05

## 2022-03-05 RX ORDER — IBUPROFEN 800 MG/1
800 TABLET, FILM COATED ORAL EVERY 6 HOURS
Status: DISCONTINUED | OUTPATIENT
Start: 2022-03-06 | End: 2022-03-08 | Stop reason: HOSPADM

## 2022-03-05 RX ORDER — OXYTOCIN 10 [USP'U]/ML
10 INJECTION, SOLUTION INTRAMUSCULAR; INTRAVENOUS
Status: DISCONTINUED | OUTPATIENT
Start: 2022-03-05 | End: 2022-03-05 | Stop reason: HOSPADM

## 2022-03-05 RX ORDER — MISOPROSTOL 200 UG/1
400 TABLET ORAL
Status: DISCONTINUED | OUTPATIENT
Start: 2022-03-05 | End: 2022-03-08 | Stop reason: HOSPADM

## 2022-03-05 RX ORDER — SIMETHICONE 80 MG
80 TABLET,CHEWABLE ORAL 4 TIMES DAILY PRN
Status: DISCONTINUED | OUTPATIENT
Start: 2022-03-05 | End: 2022-03-08 | Stop reason: HOSPADM

## 2022-03-05 RX ORDER — ONDANSETRON 4 MG/1
4 TABLET, ORALLY DISINTEGRATING ORAL EVERY 30 MIN PRN
Status: DISCONTINUED | OUTPATIENT
Start: 2022-03-05 | End: 2022-03-08 | Stop reason: HOSPADM

## 2022-03-05 RX ORDER — METOCLOPRAMIDE HYDROCHLORIDE 5 MG/ML
10 INJECTION INTRAMUSCULAR; INTRAVENOUS EVERY 6 HOURS PRN
Status: DISCONTINUED | OUTPATIENT
Start: 2022-03-05 | End: 2022-03-08 | Stop reason: HOSPADM

## 2022-03-05 RX ORDER — MORPHINE SULFATE 0.5 MG/ML
150 INJECTION, SOLUTION EPIDURAL; INTRATHECAL; INTRAVENOUS ONCE
Status: DISCONTINUED | OUTPATIENT
Start: 2022-03-05 | End: 2022-03-05 | Stop reason: HOSPADM

## 2022-03-05 RX ORDER — ONDANSETRON 4 MG/1
4 TABLET, ORALLY DISINTEGRATING ORAL EVERY 6 HOURS PRN
Status: DISCONTINUED | OUTPATIENT
Start: 2022-03-05 | End: 2022-03-08 | Stop reason: HOSPADM

## 2022-03-05 RX ORDER — HYDROXYZINE HYDROCHLORIDE 50 MG/1
TABLET, FILM COATED ORAL
Status: DISPENSED
Start: 2022-03-05 | End: 2022-03-05

## 2022-03-05 RX ORDER — CEFAZOLIN SODIUM 2 G/100ML
2 INJECTION, SOLUTION INTRAVENOUS SEE ADMIN INSTRUCTIONS
Status: DISCONTINUED | OUTPATIENT
Start: 2022-03-05 | End: 2022-03-05 | Stop reason: HOSPADM

## 2022-03-05 RX ORDER — ONDANSETRON 2 MG/ML
4 INJECTION INTRAMUSCULAR; INTRAVENOUS EVERY 6 HOURS PRN
Status: DISCONTINUED | OUTPATIENT
Start: 2022-03-05 | End: 2022-03-08 | Stop reason: HOSPADM

## 2022-03-05 RX ORDER — PROCHLORPERAZINE MALEATE 10 MG
10 TABLET ORAL EVERY 6 HOURS PRN
Status: DISCONTINUED | OUTPATIENT
Start: 2022-03-05 | End: 2022-03-08 | Stop reason: HOSPADM

## 2022-03-05 RX ORDER — AMITRIPTYLINE HYDROCHLORIDE 10 MG/1
10 TABLET ORAL AT BEDTIME
Status: DISCONTINUED | OUTPATIENT
Start: 2022-03-05 | End: 2022-03-07 | Stop reason: CLARIF

## 2022-03-05 RX ORDER — HYDROXYZINE HYDROCHLORIDE 50 MG/1
50 TABLET, FILM COATED ORAL EVERY 6 HOURS PRN
Status: DISCONTINUED | OUTPATIENT
Start: 2022-03-05 | End: 2022-03-08 | Stop reason: HOSPADM

## 2022-03-05 RX ORDER — NALOXONE HYDROCHLORIDE 0.4 MG/ML
0.4 INJECTION, SOLUTION INTRAMUSCULAR; INTRAVENOUS; SUBCUTANEOUS
Status: DISCONTINUED | OUTPATIENT
Start: 2022-03-05 | End: 2022-03-08 | Stop reason: HOSPADM

## 2022-03-05 RX ORDER — OXYTOCIN/0.9 % SODIUM CHLORIDE 30/500 ML
340 PLASTIC BAG, INJECTION (ML) INTRAVENOUS CONTINUOUS PRN
Status: DISCONTINUED | OUTPATIENT
Start: 2022-03-05 | End: 2022-03-05 | Stop reason: HOSPADM

## 2022-03-05 RX ORDER — HYDROMORPHONE HCL IN WATER/PF 6 MG/30 ML
0.2 PATIENT CONTROLLED ANALGESIA SYRINGE INTRAVENOUS EVERY 5 MIN PRN
Status: DISCONTINUED | OUTPATIENT
Start: 2022-03-05 | End: 2022-03-08 | Stop reason: HOSPADM

## 2022-03-05 RX ORDER — AMOXICILLIN 250 MG
2 CAPSULE ORAL 2 TIMES DAILY
Status: DISCONTINUED | OUTPATIENT
Start: 2022-03-05 | End: 2022-03-08 | Stop reason: HOSPADM

## 2022-03-05 RX ORDER — DEXTROSE, SODIUM CHLORIDE, SODIUM LACTATE, POTASSIUM CHLORIDE, AND CALCIUM CHLORIDE 5; .6; .31; .03; .02 G/100ML; G/100ML; G/100ML; G/100ML; G/100ML
INJECTION, SOLUTION INTRAVENOUS CONTINUOUS
Status: DISCONTINUED | OUTPATIENT
Start: 2022-03-05 | End: 2022-03-08 | Stop reason: HOSPADM

## 2022-03-05 RX ORDER — HYDROXYZINE HYDROCHLORIDE 25 MG/1
25 TABLET, FILM COATED ORAL EVERY 6 HOURS PRN
Status: DISCONTINUED | OUTPATIENT
Start: 2022-03-05 | End: 2022-03-08 | Stop reason: HOSPADM

## 2022-03-05 RX ORDER — BISACODYL 10 MG
10 SUPPOSITORY, RECTAL RECTAL DAILY PRN
Status: DISCONTINUED | OUTPATIENT
Start: 2022-03-07 | End: 2022-03-08 | Stop reason: HOSPADM

## 2022-03-05 RX ORDER — ONDANSETRON 2 MG/ML
4 INJECTION INTRAMUSCULAR; INTRAVENOUS EVERY 30 MIN PRN
Status: DISCONTINUED | OUTPATIENT
Start: 2022-03-05 | End: 2022-03-08 | Stop reason: HOSPADM

## 2022-03-05 RX ORDER — HYDROCORTISONE 2.5 %
CREAM (GRAM) TOPICAL 3 TIMES DAILY PRN
Status: DISCONTINUED | OUTPATIENT
Start: 2022-03-05 | End: 2022-03-08 | Stop reason: HOSPADM

## 2022-03-05 RX ORDER — AMOXICILLIN 250 MG
1 CAPSULE ORAL 2 TIMES DAILY
Status: DISCONTINUED | OUTPATIENT
Start: 2022-03-05 | End: 2022-03-08 | Stop reason: HOSPADM

## 2022-03-05 RX ORDER — LIDOCAINE 40 MG/G
CREAM TOPICAL
Status: DISCONTINUED | OUTPATIENT
Start: 2022-03-05 | End: 2022-03-08 | Stop reason: HOSPADM

## 2022-03-05 RX ORDER — BUPIVACAINE HYDROCHLORIDE 7.5 MG/ML
INJECTION, SOLUTION INTRASPINAL
Status: COMPLETED | OUTPATIENT
Start: 2022-03-05 | End: 2022-03-05

## 2022-03-05 RX ORDER — CYCLOBENZAPRINE HCL 10 MG
10 TABLET ORAL AT BEDTIME
Status: DISCONTINUED | OUTPATIENT
Start: 2022-03-05 | End: 2022-03-07 | Stop reason: CLARIF

## 2022-03-05 RX ADMIN — BUPIVACAINE HYDROCHLORIDE IN DEXTROSE 1.6 ML: 7.5 INJECTION, SOLUTION SUBARACHNOID at 12:46

## 2022-03-05 RX ADMIN — Medication 5 MG: at 13:52

## 2022-03-05 RX ADMIN — SODIUM CITRATE AND CITRIC ACID MONOHYDRATE 30 ML: 500; 334 SOLUTION ORAL at 12:12

## 2022-03-05 RX ADMIN — CEFAZOLIN SODIUM 2 G: 2 INJECTION, SOLUTION INTRAVENOUS at 12:14

## 2022-03-05 RX ADMIN — OXYCODONE HYDROCHLORIDE 5 MG: 5 TABLET ORAL at 19:12

## 2022-03-05 RX ADMIN — ACETAMINOPHEN 975 MG: 325 TABLET ORAL at 19:06

## 2022-03-05 RX ADMIN — Medication 10 MG: at 13:44

## 2022-03-05 RX ADMIN — PHENYLEPHRINE HYDROCHLORIDE 100 MCG: 10 INJECTION INTRAVENOUS at 12:51

## 2022-03-05 RX ADMIN — HYDROXYZINE HYDROCHLORIDE 50 MG: 50 TABLET, FILM COATED ORAL at 09:23

## 2022-03-05 RX ADMIN — PRENATAL VIT W/ FE FUMARATE-FA TAB 27-0.8 MG 1 TABLET: 27-0.8 TAB at 19:06

## 2022-03-05 RX ADMIN — DOCUSATE SODIUM AND SENNOSIDES 2 TABLET: 8.6; 5 TABLET ORAL at 20:50

## 2022-03-05 RX ADMIN — PHENYLEPHRINE HYDROCHLORIDE 0.5 MCG/KG/MIN: 10 INJECTION INTRAVENOUS at 12:44

## 2022-03-05 RX ADMIN — CYCLOBENZAPRINE 10 MG: 10 TABLET, FILM COATED ORAL at 20:50

## 2022-03-05 RX ADMIN — KETOROLAC TROMETHAMINE 30 MG: 30 INJECTION, SOLUTION INTRAMUSCULAR; INTRAVENOUS at 19:55

## 2022-03-05 RX ADMIN — TERBUTALINE SULFATE 0.25 MG: 1 INJECTION SUBCUTANEOUS at 08:16

## 2022-03-05 RX ADMIN — SODIUM CHLORIDE, POTASSIUM CHLORIDE, SODIUM LACTATE AND CALCIUM CHLORIDE 500 ML/HR: 600; 310; 30; 20 INJECTION, SOLUTION INTRAVENOUS at 12:01

## 2022-03-05 RX ADMIN — Medication 300 ML/HR: at 13:09

## 2022-03-05 RX ADMIN — ACETAMINOPHEN 975 MG: 325 TABLET ORAL at 12:11

## 2022-03-05 RX ADMIN — PHENYLEPHRINE HYDROCHLORIDE 100 MCG: 10 INJECTION INTRAVENOUS at 13:03

## 2022-03-05 RX ADMIN — ONDANSETRON 4 MG: 2 INJECTION INTRAMUSCULAR; INTRAVENOUS at 12:49

## 2022-03-05 RX ADMIN — GLYCOPYRROLATE 0.2 MG: 0.2 INJECTION, SOLUTION INTRAMUSCULAR; INTRAVENOUS at 12:52

## 2022-03-05 RX ADMIN — Medication 0.15 MG: at 12:46

## 2022-03-05 RX ADMIN — Medication 10 MG: at 13:38

## 2022-03-05 RX ADMIN — PHENYLEPHRINE HYDROCHLORIDE 100 MCG: 10 INJECTION INTRAVENOUS at 13:24

## 2022-03-05 RX ADMIN — AMITRIPTYLINE HYDROCHLORIDE 10 MG: 10 TABLET, FILM COATED ORAL at 20:50

## 2022-03-05 ASSESSMENT — ACTIVITIES OF DAILY LIVING (ADL)
DOING_ERRANDS_INDEPENDENTLY_DIFFICULTY: NO
FALL_HISTORY_WITHIN_LAST_SIX_MONTHS: NO
DRESSING/BATHING_DIFFICULTY: NO
DIFFICULTY_EATING/SWALLOWING: NO
CONCENTRATING,_REMEMBERING_OR_MAKING_DECISIONS_DIFFICULTY: NO
WALKING_OR_CLIMBING_STAIRS_DIFFICULTY: NO
WEAR_GLASSES_OR_BLIND: YES
TOILETING_ISSUES: NO
CHANGE_IN_FUNCTIONAL_STATUS_SINCE_ONSET_OF_CURRENT_ILLNESS/INJURY: NO

## 2022-03-05 ASSESSMENT — ENCOUNTER SYMPTOMS: SEIZURES: 1

## 2022-03-05 NOTE — H&P
OB HISTORY AND PHYSICAL EXAM    Pao Baeza  1975  [id number]    HPI: 47 yo  at 36+0 weeks presents with contractions over night. Patient's history significant for a  section 20 years ago for  labor at 34 week and breech presentation. She has chosen for this pregnancy to move forward with a repeat  section which is scheduled for 3/29/2022.     Estimated Date of Delivery: 2022                       EGA 36w0d    OB History    Para Term  AB Living   4 1 0 1 2 1   SAB IAB Ectopic Multiple Live Births   2 0 0 0 1      # Outcome Date GA Lbr Vadim/2nd Weight Sex Delivery Anes PTL Lv   4 Current            3 SAB      SAB      2 SAB      SAB      1          NATHAN       Prenatal Complications  Seizure disorder  Unable to do the GCT  AMA  Gestational HTN, but was taken off of her antihypertensives      Exam:    /85   Pulse 107   Temp 98.4  F (36.9  C) (Oral)   Resp 18         HEENT          WNL              Heart              WNL               Lungs             WNL                      Abdomen        WNL                       Extremities     WNL                     Vaginal exam  -70/-3      Fetal Status    Category I    Assessment:   47 yo  at 36+0 weeks  H/O  section, desires repeat  Presents with contractions. No cervical change since admit    Plan  I explained the situation with the patient. She is patrica with no cervical change. Options are to go home and return when labor ensues, stay here longer with reevaluation later, or I can give her a shot of terbutaline. She opts for the terbutaline for now.     Chanda Olguin M.D.

## 2022-03-05 NOTE — ANESTHESIA PROCEDURE NOTES
Intrathecal injection Procedure Note    Pre-Procedure   Staff -        Anesthesiologist:  Belgica Antonio MD       Performed By: anesthesiologist       Location: OR       Procedure Start/Stop Times: 3/5/2022 12:45 PM and 3/5/2022 12:46 PM       Pre-Anesthestic Checklist: patient identified, IV checked, risks and benefits discussed, informed consent, monitors and equipment checked, pre-op evaluation, at physician/surgeon's request and post-op pain management  Timeout:       Correct Patient: Yes        Correct Procedure: Yes        Correct Site: Yes        Correct Position: Yes   Procedure Documentation  Procedure: intrathecal injection       Patient Position: sitting       Skin prep: Chloraprep       Insertion Site: L2-3. (midline approach).       Needle Gauge: 24.        : pencil tip.       Introducer used       Introducer: 20 G      # of attempts: 1 and  # of redirects:     Assessment/Narrative         Paresthesias: No.       CSF fluid: clear.    Medication(s) Administered   0.75% Hyperbaric Bupivacaine (Intrathecal), 1.6 mL  Morphine PF 1 mg/mL (Intrathecal), 0.15 mg  Medication Administration Time: 3/5/2022 12:46 PM     Comments:  No issues. Good block.

## 2022-03-05 NOTE — PROGRESS NOTES
Data: [facility] presented to [facility]: 3/5/2022  6:02 AM.  Reason for maternal/fetal assessment is  contractions.Monitors placed upon arrival by previous RN. Previous RN performed SVE (). Cat 1 FHR. . Gestational Age 36w0d. VSS. Fetal movement present. Patient denies nausea, vomiting, headache, visual disturbances, epigastric or URQ pain, significant edema. Support person is present.   Patient has had one previous C/S and is scheduled for a repeat C/S with tubal on 3/29.  She reports GDM and chronic hypertension.  Action: Verbal consent for EFM. Triage assessment completed. Bill of rights reviewed.  Response: Dr. Luevano, [facility], updated, evaluated patient at bedside and orders received for repeat SVE at 0730.  No cervical change.  Dr. Luevano then spoke with patient and gave options for plan of care.  Patient opting to try one dose of terbutaline at this time.

## 2022-03-05 NOTE — ANESTHESIA PREPROCEDURE EVALUATION
Anesthesia Pre-Procedure Evaluation    Patient: Pao Baeza   MRN: [id number] : 1975        Procedure : Procedure(s):   SECTION          Past Medical History:   Diagnosis Date     Depressive disorder     and anxiety     Diabetes (H)     GDM treatment d/t not taking 1hr test     Fibromyalgia      Hypertension      Irritable bowel      Migraines      Seizure disorder (H)      Uncomplicated asthma     prior to pregnancy / exercise induced      Past Surgical History:   Procedure Laterality Date     ADENOIDECTOMY        SECTION  2002     GYN SURGERY      ovarian cysts     RHINOPLASTY       TONSILLECTOMY       TYMPANOPLASTY        Allergies   Allergen Reactions     Demerol Nausea and Vomiting     Erythromycin      severe vomiting     Gluten Meal      Sulfa Drugs      Other reaction(s): Vomiting     Tetracyclines      vomiting      Social History     Tobacco Use     Smoking status: Never Smoker     Smokeless tobacco: Never Used   Substance Use Topics     Alcohol use: Not Currently      Wt Readings from Last 1 Encounters:   22 74.8 kg (165 lb)        Anesthesia Evaluation            ROS/MED HX  ENT/Pulmonary:     (+) asthma     Neurologic:     (+) migraines, seizures,     Cardiovascular:     (+) hypertension-----    METS/Exercise Tolerance: >4 METS    Hematologic:       Musculoskeletal:       GI/Hepatic: Comment: ibs      Renal/Genitourinary:  - neg Renal ROS     Endo:  - neg endo ROS     Psychiatric/Substance Use:  - neg psychiatric ROS     Infectious Disease:  - neg infectious disease ROS     Malignancy:       Other:      (+) , H/O Chronic Pain,        Physical Exam    Airway        Mallampati: III   TM distance: > 3 FB   Neck ROM: full   Mouth opening: > 3 cm    Respiratory Devices and Support         Dental       (+) chipped      Cardiovascular   cardiovascular exam normal          Pulmonary   pulmonary exam normal            Other findings: gravid    OUTSIDE LABS:  CBC:   Lab  Results   Component Value Date    WBC 7.8 12/07/2021    WBC 5.9 04/17/2018    HGB 13.1 12/07/2021    HGB 13.9 04/17/2018    HCT 40.2 12/07/2021    HCT 41.0 04/17/2018     12/07/2021     04/17/2018     BMP:   Lab Results   Component Value Date     12/07/2021     04/17/2018    POTASSIUM 3.6 12/07/2021    POTASSIUM 3.7 04/17/2018    CHLORIDE 105 12/07/2021    CHLORIDE 104 04/17/2018    CO2 24 12/07/2021    CO2 27 04/17/2018    BUN 7 (L) 12/07/2021    BUN 13 04/17/2018    CR 0.57 (L) 12/07/2021    CR 0.71 04/17/2018    GLC 91 12/07/2021    GLC 94 04/17/2018     COAGS: No results found for: PTT, INR, FIBR  POC: No results found for: BGM, HCG, HCGS  HEPATIC: No results found for: ALBUMIN, PROTTOTAL, ALT, AST, GGT, ALKPHOS, BILITOTAL, BILIDIRECT, NASRIN  OTHER:   Lab Results   Component Value Date    LINDEN 8.6 12/07/2021    MAG 1.7 (L) 12/07/2021    TSH 1.76 12/07/2021       Anesthesia Plan    ASA Status:  3, emergent    NPO Status:  NPO Appropriate    Anesthesia Type: Spinal.   Induction: N/a.   Maintenance: N/A.        Consents    Anesthesia Plan(s) and associated risks, benefits, and realistic alternatives discussed. Questions answered and patient/representative(s) expressed understanding.    - Discussed:     - Discussed with:  Patient      - Extended Intubation/Ventilatory Support Discussed: No.      - Patient is DNR/DNI Status: No         Postoperative Care    Pain management: IV analgesics, intrathecal morphine, Multi-modal analgesia.   PONV prophylaxis: Ondansetron (or other 5HT-3)     Comments:    Other Comments: Sab with duramorph.              Belgica Antonio MD

## 2022-03-05 NOTE — ANESTHESIA POSTPROCEDURE EVALUATION
Patient: Pao Baeza    Procedure: Procedure(s):   SECTION   SECTION, WITH POSTPARTUM TUBAL LIGATION       Anesthesia Type:  Spinal    Note:  Disposition: Inpatient   Postop Pain Control: Uneventful            Sign Out: Well controlled pain   PONV: No   Neuro/Psych: Uneventful            Sign Out: Acceptable/Baseline neuro status   Airway/Respiratory: Uneventful            Sign Out: Acceptable/Baseline resp. status   CV/Hemodynamics: Uneventful            Sign Out: Acceptable CV status; No obvious hypovolemia; No obvious fluid overload   Other NRE: NONE   DID A NON-ROUTINE EVENT OCCUR? No    Event details/Postop Comments:  No issues.  Teeth atraumatic.  Pain controlled.  AVSS.  No awareness.  Eyes without trauma.            Last vitals:  Vitals Value Taken Time   /62 22 1441   Temp 36.6  C (97.8  F) 22 1406   Pulse     Resp 12 22 1406   SpO2 98 % 22 1442   Vitals shown include unvalidated device data.    Electronically Signed By: Belgica Antonio MD  2022  2:45 PM

## 2022-03-05 NOTE — ANESTHESIA CARE TRANSFER NOTE
Patient: Pao Baeza    Procedure: Procedure(s):   SECTION   SECTION, WITH POSTPARTUM TUBAL LIGATION       Diagnosis: S/P repeat low transverse  [Z98.891]  Diagnosis Additional Information: No value filed.    Anesthesia Type:   Spinal     Note:    Oropharynx: oropharynx clear of all foreign objects and spontaneously breathing  Level of Consciousness: awake  Oxygen Supplementation: room air    Independent Airway: airway patency satisfactory and stable  Dentition: dentition unchanged  Vital Signs Stable: post-procedure vital signs reviewed and stable  Report to RN Given: handoff report given  Patient transferred to: Labor and Delivery    Handoff Report: Identifed the Patient, Identified the Reponsible Provider, Reviewed the pertinent medical history, Discussed the surgical course, Reviewed Intra-OP anesthesia mangement and issues during anesthesia, Set expectations for post-procedure period and Allowed opportunity for questions and acknowledgement of understanding      Vitals:  Vitals Value Taken Time   /59 22 1406   Temp 36.6  C (97.8  F) 22 1406   Pulse     Resp 12 22 1406   SpO2 99 % 22 1406       Electronically Signed By: DONNY Carlton CRNA  2022  2:06 PM

## 2022-03-05 NOTE — OP NOTE
Section/Tubal Ligation Operative Note    NAME:  Pao Baeza     RECORD # [id number]     3/5/2022    DATE OF SERVICE: 3/5/2022     PREOPERATIVE DIAGNOSIS:   1) IUP at 36+0 weeks  2) Early labor  3) Desires sterilization     POSTOPERATIVE DIAGNOSIS:   1) IUP at 36+0 weeks  2) Early labor  3) Desires sterilization    PROCEDURE: Low transverse  section, bilateral tubal ligation    SURGEON: Chanda Luevano M.D.    ANESTHESIA: Spinal    ESTIMATED BLOOD LOSS: 508 cc    DRAINS: Paige catheter.    COMPLICATIONS: None    INDICATIONS:  Ms. Pao Baeza is a 46 year old year old who presents with contractions. Her cervix has changed from the previous check and she is now patrica every 1-2 minutes. She had a C/S 20 years ago and desires a repeat. I offered a trial with labor, but she declines. She wants to move forward with a  section. She also does not want anymore pregnancies.     FINDINGS:  Live male infant born with Apgars of 5/8/9,  Weight 5#4oz.  Normal tubes, ovaries, and pelvic organs.    PROCEDURE:  Patient was met preoperatively where we discussed the procedure and the risks associated with the procedure.  She understood these to include but not limited to injury to adjacent organs including bowel, bladder, ureter, infection and bleeding. Understanding these risks her consents were signed.      After informed consent was obtained, the patient was taken to the operating room where she was given spinal anesthesia.  She was placed in the left lateral tilt position and prepped and draped in usual sterile fashion.  A timeout was undertaken.      A Pfannenstiel skin incision was made with the scalpel and taken down through the subcutaneous tissue.  The rectus fascia was identified and scored in the midline.  The rectus fascia was then opened bilaterally.  The rectus fascia was taken down from the underlying muscle superiorly and inferiorly.  The peritoneum was identified and entered sharply  using scissors.  The bladder flap was created with the Metzenbaum scissors.  The bladder blade re-inserted.    A low segment transverse uterine incision was made with a scalpel.  The uterine incision was opened bluntly with my fingers.  The infant was delivered from a vertex position.  The head was delivered atraumatically.  The remainder of the body was delivered without issues.  The mouth and nares were suctioned.  The cord was doubly clamped and cut and the infant was handed to the pediatric team with the findings as noted above.  The placenta was then removed with gentle traction.  The uterine cavity was wiped free of clot and debris. The uterine incision was closed using 0 Vicryl in a running locked fashion.  A second imbricating layer was placed using 0 Monocryl.  There was bleeding noted from a branch off of the left uterine artery. I placed two figure of X stitches in this area and hemostasis was achieved. Gutters were irrigated.     The tubal was performed at this time. The left tube was grasped. A window was created in the mesosalpinx. 4 free ties were placed, 2 on each side of the knuckle. The knuckle of tube was cut. Hemostasis was noted. This was repeated on the right side. Hemostasis was noted.     The peritoneum was closed using 3-0 Vicryl. The rectus muscles were hemostatic.  The rectus fascia was closed using 0 vicryl, starting at the lateral edges meeting in the midline.  Skin margins were re-approximated using the Insorb staplers.      Sponge and needle counts were reported to me as correct X 2.  Mother and infant are stable.      Chanda Luevano M.D.

## 2022-03-05 NOTE — PROGRESS NOTES
In House OB    Patient has now begun to contract once again. She is patrica every 1-2 minutes.   Her cervix has made change since the last exam (same RN doing the exam).  Will move forward with a  section and tubal ligation. COVID swab now.     Chanda Luevano M.D.

## 2022-03-06 LAB
HGB BLD-MCNC: 12.6 G/DL (ref 11.7–15.7)
T PALLIDUM AB SER QL: NONREACTIVE

## 2022-03-06 PROCEDURE — 250N000013 HC RX MED GY IP 250 OP 250 PS 637: Performed by: OBSTETRICS & GYNECOLOGY

## 2022-03-06 PROCEDURE — 36415 COLL VENOUS BLD VENIPUNCTURE: CPT | Performed by: OBSTETRICS & GYNECOLOGY

## 2022-03-06 PROCEDURE — 85018 HEMOGLOBIN: CPT | Performed by: OBSTETRICS & GYNECOLOGY

## 2022-03-06 PROCEDURE — 250N000011 HC RX IP 250 OP 636: Performed by: ANESTHESIOLOGY

## 2022-03-06 PROCEDURE — 250N000011 HC RX IP 250 OP 636: Performed by: OBSTETRICS & GYNECOLOGY

## 2022-03-06 PROCEDURE — 120N000001 HC R&B MED SURG/OB

## 2022-03-06 RX ORDER — BUTALBITAL, ACETAMINOPHEN AND CAFFEINE 50; 325; 40 MG/1; MG/1; MG/1
1 TABLET ORAL EVERY 6 HOURS PRN
Status: DISCONTINUED | OUTPATIENT
Start: 2022-03-06 | End: 2022-03-08 | Stop reason: HOSPADM

## 2022-03-06 RX ORDER — DIPHENHYDRAMINE HCL 25 MG
50 TABLET ORAL EVERY 6 HOURS PRN
Status: DISCONTINUED | OUTPATIENT
Start: 2022-03-06 | End: 2022-03-08 | Stop reason: HOSPADM

## 2022-03-06 RX ORDER — DIPHENHYDRAMINE HYDROCHLORIDE 50 MG/ML
25 INJECTION INTRAMUSCULAR; INTRAVENOUS EVERY 6 HOURS PRN
Status: DISCONTINUED | OUTPATIENT
Start: 2022-03-06 | End: 2022-03-08 | Stop reason: HOSPADM

## 2022-03-06 RX ADMIN — IBUPROFEN 800 MG: 800 TABLET, FILM COATED ORAL at 19:40

## 2022-03-06 RX ADMIN — DOCUSATE SODIUM AND SENNOSIDES 2 TABLET: 8.6; 5 TABLET ORAL at 19:59

## 2022-03-06 RX ADMIN — SIMETHICONE 80 MG: 80 TABLET, CHEWABLE ORAL at 21:54

## 2022-03-06 RX ADMIN — DOCUSATE SODIUM AND SENNOSIDES 1 TABLET: 8.6; 5 TABLET ORAL at 08:23

## 2022-03-06 RX ADMIN — AMITRIPTYLINE HYDROCHLORIDE 10 MG: 10 TABLET, FILM COATED ORAL at 20:00

## 2022-03-06 RX ADMIN — IBUPROFEN 800 MG: 800 TABLET, FILM COATED ORAL at 14:36

## 2022-03-06 RX ADMIN — DIPHENHYDRAMINE HCL 50 MG: 25 TABLET ORAL at 21:57

## 2022-03-06 RX ADMIN — BUTALBITAL, ACETAMINOPHEN AND CAFFEINE 1 TABLET: 50; 325; 40 TABLET ORAL at 19:40

## 2022-03-06 RX ADMIN — OXYCODONE HYDROCHLORIDE 5 MG: 5 TABLET ORAL at 20:16

## 2022-03-06 RX ADMIN — KETOROLAC TROMETHAMINE 30 MG: 30 INJECTION, SOLUTION INTRAMUSCULAR; INTRAVENOUS at 01:50

## 2022-03-06 RX ADMIN — NALBUPHINE HYDROCHLORIDE 2.5 MG: 10 INJECTION, SOLUTION INTRAMUSCULAR; INTRAVENOUS; SUBCUTANEOUS at 00:23

## 2022-03-06 RX ADMIN — ACETAMINOPHEN 975 MG: 325 TABLET ORAL at 01:05

## 2022-03-06 RX ADMIN — CYCLOBENZAPRINE 10 MG: 10 TABLET, FILM COATED ORAL at 19:58

## 2022-03-06 RX ADMIN — ACETAMINOPHEN 975 MG: 325 TABLET ORAL at 07:42

## 2022-03-06 RX ADMIN — ACETAMINOPHEN 650 MG: 325 TABLET ORAL at 20:01

## 2022-03-06 RX ADMIN — ACETAMINOPHEN 975 MG: 325 TABLET ORAL at 13:20

## 2022-03-06 RX ADMIN — NALBUPHINE HYDROCHLORIDE 5 MG: 10 INJECTION, SOLUTION INTRAMUSCULAR; INTRAVENOUS; SUBCUTANEOUS at 02:01

## 2022-03-06 RX ADMIN — OXYCODONE HYDROCHLORIDE 5 MG: 5 TABLET ORAL at 11:36

## 2022-03-06 RX ADMIN — HYDROXYZINE HYDROCHLORIDE 50 MG: 50 TABLET, FILM COATED ORAL at 19:39

## 2022-03-06 RX ADMIN — NALBUPHINE HYDROCHLORIDE 5 MG: 10 INJECTION, SOLUTION INTRAMUSCULAR; INTRAVENOUS; SUBCUTANEOUS at 08:22

## 2022-03-06 RX ADMIN — KETOROLAC TROMETHAMINE 30 MG: 30 INJECTION, SOLUTION INTRAMUSCULAR; INTRAVENOUS at 08:23

## 2022-03-06 RX ADMIN — HYDROXYZINE HYDROCHLORIDE 25 MG: 25 TABLET ORAL at 01:03

## 2022-03-06 RX ADMIN — PRENATAL VIT W/ FE FUMARATE-FA TAB 27-0.8 MG 1 TABLET: 27-0.8 TAB at 08:23

## 2022-03-06 NOTE — PROGRESS NOTES
Csection - Post operative day 1    ASSESSMENT: PLAN:   POD#1    Repeat csection with bilateral tubal ligation (BTL)   dleivery   Doing well  Continue routine cares   aniticipate discharge in am    SUBJECTIVE:    The patient feels well: Catheter is out, bleeding decreased, tolerating normal diet, and passing flatus.  Pain is well controlled. The patient has no emotional concerns.  The baby is well and being fed    OBJECTIVE:  /69 (Cuff Size: Adult Regular)   Pulse 94   Temp 97.9  F (36.6  C) (Oral)   Resp 18   SpO2 96%   Breastfeeding Unknown     Fundus firm  Incision- dressing dry   Ext- nontender      Lab  Hemoglobin   Date Value Ref Range Status   2022 12.6 11.7 - 15.7 g/dL Final   ]'      Annie Landa MD  [facility]  [phone]

## 2022-03-06 NOTE — PLAN OF CARE
Problem: Bleeding (Postpartum  Delivery)  Goal: Hemostasis  Outcome: Ongoing, Progressing     Problem: Pain (Postpartum  Delivery)  Goal: Acceptable Pain Control  Outcome: Ongoing, Progressing  Intervention: Prevent or Manage Pain  Recent Flowsheet Documentation  Taken 3/5/2022 2000 by Kirstin Renee, RN  Complementary Therapy: essential oils utilized     Problem: Postoperative Urinary Retention (Postpartum  Delivery)  Goal: Effective Urinary Elimination  Outcome: Ongoing, Progressing   VSS, progressing WDL, pain is controlled with scheduled and PRN medications, Nubain is given for itching

## 2022-03-06 NOTE — PLAN OF CARE
"  Problem: Plan of Care - These are the overarching goals to be used throughout the patient stay.    Goal: Plan of Care Review/Shift Note  Description: The Plan of Care Review/Shift note should be completed every shift.  The Outcome Evaluation is a brief statement about your assessment that the patient is improving, declining, or no change.  This information will be displayed automatically on your shift note.  Outcome: Ongoing, Progressing  Flowsheets (Taken 3/5/2022 1841)  Plan of Care Reviewed With: patient  Overall Patient Progress: improving     Marleny is aware of plan to be standing at bedside by 8pm tonight.  She is currently requesting her home bedtime meds and when reviewing them she states she is does not want her depakote or medications for her seizure disorder d/t the fact that she stopped them \"awhile ago\" when prescription ran out and they are not supposed to be started and stopped.  She does report that she had a seizure last night.    "

## 2022-03-06 NOTE — LACTATION NOTE
"This note was copied from a baby's chart.  [place] is late  (36w) and 5 lb 4.3 oz. The plan of care (below) was provided and reviewed with parents. [place] \"Justo\" received dextrose gel for a low blood sugar this morning. Writer (RN, IBCLC) checked a recent BG before the last feeding, which was normal (74). Pao was assisted with manual expression and finger feeding 2 ml of colostrum to Justo. A latch attempt at breast was made, but he was too sleepy and disinterested. Justo was bottle fed 15 ml of formula (24 kcal). His suck is weak and uncoordinated. Writer bottle fed him using chin/jaw support, which was helpful. Pao has the hospital breast pump at her bedside and has been encouraged to pump after feedings to stimulate and promote an optimal milk supply. Emotional support and encouragement provided. Benefits of skin-to-skin reviewed.     Care Plan Breastfeeding Late /Early Term/Low Birth Weight Baby     May struggle to sustain a latch due to thinner fat pads in cheeks    May have decreased energy to stay at breast long enough to get enough milk    Decreased milk transfer over time will result in infant weight loss and low milk supply    Feeding Plan    Hand express, as needed    Breastfeed 8-12+ times in 24 hours    Watch for:   o Rhythmic sucking and swallowing during feeding  o Content baby after feeding  o Adequate wet & dirty diapers (per feeding log)      Supplement If infant does not latch or is sleepy at breast, end breastfeeding and feed expressed milk using the amounts below as a guideline; give more as baby cues. If necessary, make up the difference with donor milk or formula as a bridge until milk supply increases:    o 0-24 hours 2-10 ml each feeding  o 24-48 hours 5-15 ml each feeding  o 48-72 hours 15-30 ml each feeding  o 72-96 hours 30-60 ml each feeding      Pump after feedings to stimulate milk production until milk supply well established & baby breastfeeding with " rhythmic sucking and swallowing (10-20 minutes), adequate output, and weight gain.    Contact Outpatient Lactation Clinic after discharge as needed.  [phone]

## 2022-03-06 NOTE — PROVIDER NOTIFICATION
Notified Annie Landa MD of patient having taken one of her home meds Fioricet.  Acetaminophen adjusted appropriately. MD aware and will order med in patient.

## 2022-03-07 PROCEDURE — 250N000013 HC RX MED GY IP 250 OP 250 PS 637: Performed by: PHYSICIAN ASSISTANT

## 2022-03-07 PROCEDURE — 120N000001 HC R&B MED SURG/OB

## 2022-03-07 PROCEDURE — 250N000013 HC RX MED GY IP 250 OP 250 PS 637: Performed by: OBSTETRICS & GYNECOLOGY

## 2022-03-07 RX ORDER — CYCLOBENZAPRINE HCL 10 MG
10 TABLET ORAL DAILY
Status: DISCONTINUED | OUTPATIENT
Start: 2022-03-07 | End: 2022-03-08 | Stop reason: HOSPADM

## 2022-03-07 RX ORDER — ACETAMINOPHEN 325 MG/1
650 TABLET ORAL EVERY 6 HOURS
Status: DISCONTINUED | OUTPATIENT
Start: 2022-03-07 | End: 2022-03-08 | Stop reason: HOSPADM

## 2022-03-07 RX ORDER — AMITRIPTYLINE HYDROCHLORIDE 10 MG/1
10 TABLET ORAL DAILY
Status: DISCONTINUED | OUTPATIENT
Start: 2022-03-07 | End: 2022-03-08 | Stop reason: HOSPADM

## 2022-03-07 RX ORDER — LABETALOL 200 MG/1
200 TABLET, FILM COATED ORAL EVERY 12 HOURS SCHEDULED
Status: DISCONTINUED | OUTPATIENT
Start: 2022-03-07 | End: 2022-03-07

## 2022-03-07 RX ORDER — LABETALOL 200 MG/1
200 TABLET, FILM COATED ORAL EVERY 12 HOURS SCHEDULED
Status: DISCONTINUED | OUTPATIENT
Start: 2022-03-07 | End: 2022-03-08 | Stop reason: HOSPADM

## 2022-03-07 RX ORDER — OXYCODONE HYDROCHLORIDE 5 MG/1
5 TABLET ORAL
Status: DISCONTINUED | OUTPATIENT
Start: 2022-03-07 | End: 2022-03-07

## 2022-03-07 RX ORDER — PROMETHAZINE HYDROCHLORIDE 12.5 MG/1
12.5 TABLET ORAL EVERY 6 HOURS PRN
Status: DISCONTINUED | OUTPATIENT
Start: 2022-03-07 | End: 2022-03-08 | Stop reason: HOSPADM

## 2022-03-07 RX ORDER — OXYCODONE HYDROCHLORIDE 5 MG/1
5-10 TABLET ORAL EVERY 4 HOURS PRN
Status: DISCONTINUED | OUTPATIENT
Start: 2022-03-07 | End: 2022-03-08 | Stop reason: HOSPADM

## 2022-03-07 RX ADMIN — DIPHENHYDRAMINE HCL 50 MG: 25 TABLET ORAL at 16:10

## 2022-03-07 RX ADMIN — IBUPROFEN 800 MG: 800 TABLET, FILM COATED ORAL at 07:58

## 2022-03-07 RX ADMIN — LABETALOL HYDROCHLORIDE 200 MG: 200 TABLET, FILM COATED ORAL at 18:24

## 2022-03-07 RX ADMIN — PROMETHAZINE HYDROCHLORIDE 12.5 MG: 12.5 TABLET ORAL at 21:14

## 2022-03-07 RX ADMIN — DOCUSATE SODIUM AND SENNOSIDES 1 TABLET: 8.6; 5 TABLET ORAL at 23:57

## 2022-03-07 RX ADMIN — OXYCODONE HYDROCHLORIDE 10 MG: 5 TABLET ORAL at 16:09

## 2022-03-07 RX ADMIN — ACETAMINOPHEN 650 MG: 325 TABLET ORAL at 07:58

## 2022-03-07 RX ADMIN — OXYCODONE HYDROCHLORIDE 5 MG: 5 TABLET ORAL at 01:44

## 2022-03-07 RX ADMIN — BUTALBITAL, ACETAMINOPHEN AND CAFFEINE 1 TABLET: 50; 325; 40 TABLET ORAL at 07:58

## 2022-03-07 RX ADMIN — IBUPROFEN 800 MG: 800 TABLET, FILM COATED ORAL at 01:45

## 2022-03-07 RX ADMIN — OXYCODONE HYDROCHLORIDE 5 MG: 5 TABLET ORAL at 07:58

## 2022-03-07 RX ADMIN — Medication: at 04:49

## 2022-03-07 RX ADMIN — OXYCODONE HYDROCHLORIDE 5 MG: 5 TABLET ORAL at 11:46

## 2022-03-07 RX ADMIN — ACETAMINOPHEN 650 MG: 325 TABLET ORAL at 01:44

## 2022-03-07 RX ADMIN — OXYCODONE HYDROCHLORIDE 5 MG: 5 TABLET ORAL at 20:41

## 2022-03-07 RX ADMIN — BUTALBITAL, ACETAMINOPHEN AND CAFFEINE 1 TABLET: 50; 325; 40 TABLET ORAL at 23:59

## 2022-03-07 RX ADMIN — IBUPROFEN 800 MG: 800 TABLET, FILM COATED ORAL at 23:59

## 2022-03-07 RX ADMIN — IBUPROFEN 800 MG: 800 TABLET, FILM COATED ORAL at 14:01

## 2022-03-07 RX ADMIN — BUTALBITAL, ACETAMINOPHEN AND CAFFEINE 1 TABLET: 50; 325; 40 TABLET ORAL at 14:05

## 2022-03-07 RX ADMIN — DOCUSATE SODIUM AND SENNOSIDES 2 TABLET: 8.6; 5 TABLET ORAL at 09:49

## 2022-03-07 RX ADMIN — HYDROXYZINE HYDROCHLORIDE 50 MG: 50 TABLET, FILM COATED ORAL at 11:44

## 2022-03-07 RX ADMIN — HYDROXYZINE HYDROCHLORIDE 50 MG: 50 TABLET, FILM COATED ORAL at 18:24

## 2022-03-07 RX ADMIN — ACETAMINOPHEN 650 MG: 325 TABLET ORAL at 23:58

## 2022-03-07 RX ADMIN — PRENATAL VIT W/ FE FUMARATE-FA TAB 27-0.8 MG 1 TABLET: 27-0.8 TAB at 09:48

## 2022-03-07 RX ADMIN — OXYCODONE HYDROCHLORIDE 5 MG: 5 TABLET ORAL at 11:54

## 2022-03-07 RX ADMIN — ACETAMINOPHEN 650 MG: 325 TABLET ORAL at 14:02

## 2022-03-07 RX ADMIN — CYCLOBENZAPRINE 10 MG: 10 TABLET, FILM COATED ORAL at 18:45

## 2022-03-07 RX ADMIN — AMITRIPTYLINE HYDROCHLORIDE 10 MG: 10 TABLET, FILM COATED ORAL at 18:45

## 2022-03-07 RX ADMIN — DIPHENHYDRAMINE HCL 50 MG: 25 TABLET ORAL at 09:48

## 2022-03-07 NOTE — PLAN OF CARE
Problem: Adjustment to Role Transition (Postpartum  Delivery)  Goal: Successful Maternal Role Transition  Outcome: Ongoing, Progressing     Problem: Pain (Postpartum  Delivery)  Goal: Acceptable Pain Control  Outcome: Ongoing, Progressing  Intervention: Prevent or Manage Pain  Recent Flowsheet Documentation  Taken 3/7/2022 010 by Kirstin Renee, RN  Complementary Therapy: (warm pack)   essential oils utilized   other (see comments)  Taken 3/6/2022 2057 by Kirstin Renee, RN  Complementary Therapy: (warm pack)   essential oils utilized   other (see comments)

## 2022-03-07 NOTE — PROGRESS NOTES
1730: Updated Dr Alicia on elevated bp's (see flowsheet). Dr Alicia does not want lab orders placed at this time. New medication orders placed by Dr Alicia.     1921: Called into room by charge RN stating pt is not feeling well. When in room pt appears pale and lethargic. Vitals were taken (see flowsheet). Dr Alicia called into room ASAP. Pt's speech was slow and body was twitching. Pt was able to talk and explain what would be happening. See providers note.

## 2022-03-07 NOTE — LACTATION NOTE
This note was copied from a baby's chart.  This writer met with Marleny per her request to observe infant at the breast and answer questions.  This visit was 60 minutes long.  She brought infant to the breast in the football hold and was able to latch infant onto the breast.  Infant latched but did not suck.  After 5 minutes of attempting to wake infant and have him suck at the breast, FOB bottle fed infant using paced bottle feeding techniques.  Infant tolerated this well.  Marleny pumped and obtained 1 ml.  She had several detailed questions regarding what bottles to use, what equipment to buy to help breastfeeding, what foods to eat.  All questions answered.  She was encouraged to breastfeed as long as infant is active at the breast, then bottle infant as he cues and pump breasts.  Do this at least 8 times a day.  Marleny to discuss with her  regarding renting a hospital grade breast pump for one month at $88/month, as insurance will not cover.  She will let this writer know tomorrow what her plan is.  She also wants to purchase donor milk.  She will call to arrange  for tomorrow.  Marleny verbalizes understanding of all education given.  She denies any further questions.

## 2022-03-07 NOTE — LACTATION NOTE
This note was copied from a baby's chart.  This writer met with Marleny for an extended amount of time (50 + minutes) to help create a feeding plan for her LPI and educate parents.  Marleny had several questions, as did her , Nicolas.  When this writer entered the room, Marleny had infant at the breast in the cradle hold.  This writer will meet with parents again for another feeding to educate on the cross cradle hold or the football hold, as these holds will help infant sustain a deep latch and transfer more food, compared to the cradle hold.  Education provided that infant needs to take in more calories that he expends.  Feedings need to be efficient.  Marleny was educated to offer one breast for a few minutes, as long as infant is actively sucking.  Listen for swallows, then end the feeding at the breast when infant falls asleep or is not actively sucking.  Educated on paced bottle feeding.  FOB bottle fed donor milk and infant tolerated this well.  Pump use reviewed.  Marleny pumped her breasts and is obtaining 1-2 ml of colostrum.  She was assured that her her milk supply will increase every pumping session and her milk will start coming in around 60-65 hours of age.  She desires to purchase donor milk for home use.  We discussed she will be supplementing infant and pumping her breasts to drain them after every breastfeeding, until infant is able to transfer well at the breast, which may be closer to 39 weeks gestation corrected age.  We reviewed the care plan below.  Marleny verbalizes understanding of all education given.  She denies any further questions.  Lactation to follow later today.    Care Plan Breastfeeding Late /Low Birth Weight Baby     May struggle to sustain a latch due to thinner fat pads in cheeks    May have decreased energy to stay at breast long enough to get enough milk    Decreased milk transfer over time will result in infant weight loss and low milk supply    Feeding Plan    Hand express, as  needed    Breastfeed 8-12+ times in 24 hours    Watch for:   o Rhythmic sucking and swallowing during feeding  o Content baby after feeding  o Adequate wet & dirty diapers (per feeding log)      Supplement If infant does not latch or is sleepy at breast, end breastfeeding and feed expressed milk using the amounts below as a guideline; give more as baby cues. If necessary, make up the difference with donor milk or formula as a bridge until milk supply increases:    o 0-24 hours 2-10 ml each feeding  o 24-48 hours 5-15 ml each feeding  o 48-72 hours 15-30 ml each feeding  o 72-96 hours 30-60 ml each feeding      Pump after feedings to stimulate milk production until milk supply well established & baby breastfeeding with rhythmic sucking and swallowing (10-20 minutes), adequate output, and weight gain.    Contact Outpatient Lactation Clinic after discharge as needed.  [phone]                  12/2021

## 2022-03-07 NOTE — PLAN OF CARE
Problem: Plan of Care - These are the overarching goals to be used throughout the patient stay.    Goal: Plan of Care Review/Shift Note  Description: The Plan of Care Review/Shift note should be completed every shift.  The Outcome Evaluation is a brief statement about your assessment that the patient is improving, declining, or no change.  This information will be displayed automatically on your shift note.  Outcome: Ongoing, Progressing  Flowsheets (Taken 3/6/2022 1828)  Plan of Care Reviewed With:   patient   significant other  Overall Patient Progress: improving  Goal: Optimal Comfort and Wellbeing  Outcome: Ongoing, Progressing  Intervention: Monitor Pain and Promote Comfort  Recent Flowsheet Documentation  Taken 3/6/2022 1436 by Grazyna Meeks RN  Pain Management Interventions: medication (see MAR)  Taken 3/6/2022 1320 by Grazyna Meeks RN  Pain Management Interventions: medication (see MAR)  Taken 3/6/2022 1215 by Grazyna Meeks RN  Pain Management Interventions: (abdominal binder) --  Taken 3/6/2022 1136 by Grazyna Meeks RN  Pain Management Interventions: medication (see MAR)  Intervention: Provide Person-Centered Care  Recent Flowsheet Documentation  Taken 3/6/2022 1430 by Grazyna Meeks RN  Trust Relationship/Rapport:   care explained   choices provided   questions answered   questions encouraged   thoughts/feelings acknowledged     Problem: Adjustment to Role Transition (Postpartum  Delivery)  Goal: Successful Maternal Role Transition  Outcome: Ongoing, Progressing     Problem: Infection (Postpartum  Delivery)  Goal: Absence of Infection Signs and Symptoms  Outcome: Ongoing, Progressing     Problem: Pain (Postpartum  Delivery)  Goal: Acceptable Pain Control  Outcome: Ongoing, Progressing  Intervention: Prevent or Manage Pain  Recent Flowsheet Documentation  Taken 3/6/2022 1436 by Grazyna Meeks RN  Pain Management Interventions: medication (see  MAR)  Taken 3/6/2022 1320 by Grazyna Meeks RN  Pain Management Interventions: medication (see MAR)  Taken 3/6/2022 1215 by Grazyna Meeks RN  Pain Management Interventions: (abdominal binder) --  Taken 3/6/2022 1136 by Grazyna Meeks RN  Pain Management Interventions: medication (see MAR)       FF@U with scant to small flow. Up voiding and showered. Dressing off and THAIS with no drainage. Bonding well with baby. Assisted with breast feeding - lactation consult for morning of 3/7. Patient requested staying as long as allowed.

## 2022-03-07 NOTE — PROGRESS NOTES
POSTPARTUM DAY #2:      Subjective:  The patient is doing well and is sitting up on the couch doing paperwork. The patient has no emotional concerns. Pain is moderately controlled with current medications. The patient is ambulating well. She is voiding and passing gas. The amount and color of the lochia is appropriate for the duration of recovery. The patient denies passing large clots. The baby is well.    Objective   The patient has a blood pressure which is within the normal range. Urinary output is adequate.     Exam:   BP (!) 142/85 (BP Location: Left arm, Patient Position: Supine)   Pulse 104   Temp 98.1  F (36.7  C) (Oral)   Resp 18   SpO2 94%   Breastfeeding Unknown   General: NAD, alert and orientated   Abdomen: soft, NT   Fundus: firm, nontender  Incision: covered, C/D/I  Ext: no pain     LAB:  Lab Results   Component Value Date    HGB 12.6 2022         I/Os  No intake or output data in the 24 hours ending 22 1030      Impression:   Normal postpartum course, POD#2 LTCS @ 36wks secondary to Repeat with Tubal Ligation  AMA  Hx of  (Breech)  Hx of PTD @ 34wks   Fibromyalgia   Pruritis - possibly post duramorph reaction    Plan:   POD#2  - Continue current care  - Doing well  - Continue Vistaril & Benadryl for itching  - Will try 2 oxycodone to see if that helps with pain management  - Likely home tomorrow    Abdulaziz Danielson PA-C  [facility]  [phone]

## 2022-03-07 NOTE — PLAN OF CARE
Problem: Pain (Postpartum  Delivery)  Goal: Acceptable Pain Control  Outcome: Ongoing, Progressing  Intervention: Prevent or Manage Pain  Recent Flowsheet Documentation  Taken 3/7/2022 0820 by Katheryn James RN  Pain Management Interventions: medication (see MAR)  Taking pain medication when due in order to stay on top of pain management.

## 2022-03-08 VITALS
OXYGEN SATURATION: 100 % | TEMPERATURE: 98.5 F | HEART RATE: 90 BPM | RESPIRATION RATE: 18 BRPM | DIASTOLIC BLOOD PRESSURE: 74 MMHG | SYSTOLIC BLOOD PRESSURE: 125 MMHG

## 2022-03-08 PROCEDURE — 250N000013 HC RX MED GY IP 250 OP 250 PS 637: Performed by: PHYSICIAN ASSISTANT

## 2022-03-08 PROCEDURE — 250N000013 HC RX MED GY IP 250 OP 250 PS 637: Performed by: OBSTETRICS & GYNECOLOGY

## 2022-03-08 RX ORDER — LABETALOL 200 MG/1
200 TABLET, FILM COATED ORAL EVERY 12 HOURS
Qty: 14 TABLET | Refills: 0 | Status: SHIPPED | OUTPATIENT
Start: 2022-03-08

## 2022-03-08 RX ORDER — OXYCODONE HYDROCHLORIDE 5 MG/1
5-10 TABLET ORAL EVERY 4 HOURS PRN
Qty: 16 TABLET | Refills: 0 | Status: SHIPPED | OUTPATIENT
Start: 2022-03-08

## 2022-03-08 RX ADMIN — PRENATAL VIT W/ FE FUMARATE-FA TAB 27-0.8 MG 1 TABLET: 27-0.8 TAB at 08:00

## 2022-03-08 RX ADMIN — IBUPROFEN 800 MG: 800 TABLET, FILM COATED ORAL at 13:20

## 2022-03-08 RX ADMIN — IBUPROFEN 800 MG: 800 TABLET, FILM COATED ORAL at 06:18

## 2022-03-08 RX ADMIN — OXYCODONE HYDROCHLORIDE 5 MG: 5 TABLET ORAL at 13:20

## 2022-03-08 RX ADMIN — ACETAMINOPHEN 650 MG: 325 TABLET ORAL at 13:20

## 2022-03-08 RX ADMIN — DOCUSATE SODIUM AND SENNOSIDES 1 TABLET: 8.6; 5 TABLET ORAL at 00:07

## 2022-03-08 RX ADMIN — DOCUSATE SODIUM AND SENNOSIDES 2 TABLET: 8.6; 5 TABLET ORAL at 08:00

## 2022-03-08 RX ADMIN — OXYCODONE HYDROCHLORIDE 5 MG: 5 TABLET ORAL at 01:19

## 2022-03-08 RX ADMIN — LABETALOL HYDROCHLORIDE 200 MG: 200 TABLET, FILM COATED ORAL at 08:00

## 2022-03-08 RX ADMIN — ACETAMINOPHEN 650 MG: 325 TABLET ORAL at 06:18

## 2022-03-08 NOTE — PLAN OF CARE
"  Problem: Plan of Care - These are the overarching goals to be used throughout the patient stay.    Goal: Patient-Specific Goal (Individualized)  Description: You can add care plan individualizations to a care plan. Examples of Individualization might be:  \"Parent requests to be called daily at 9am for status\", \"I have a hard time hearing out of my right ear\", or \"Do not touch me to wake me up as it startles me\".  Outcome: Ongoing, Progressing   Pt will call for meds when feels able to take them due to recent episode of nausea related to pre-migraine aura.  "

## 2022-03-08 NOTE — LACTATION NOTE
"This note was copied from a baby's chart.  This writer met with Pao \"Marleny\" to offer lactation services prior to discharge.  She appears more tired today.  Infant is down to 6.3 % weight loss.  Marleny has only been pumping and bottle feeding and is not putting infant to the breast at this time.  She was encouraged to focus on getting infant to gain weight and building her milk supply, then work on breastfeeding.  She is pumping 1 ml per session.  She wanted to rent a hospital grade breast pump (HGP) even if insurance will not cover the pump.  This writer rented Marleny a HGP from [facility].  Marleny went into the bathroom.  FOB was instructed on the assembly, Maintain program use and cleaning of the breast pump.  She verbalizes understanding of how to use the breast pump.  He verbalizes understanding of how to work the pump.  Lactation is available prn.      "

## 2022-03-08 NOTE — PROGRESS NOTES
Pt has been stable since previous episode. Is resting, med for pain and nausea, waiting on her other meds to see if she tolerates these. Ate half a ham sandwich which she has kept down. Sister in law here and  plans to stay overnight. She'll call when she feels able to take her pills.

## 2022-03-08 NOTE — PROGRESS NOTES
"Outreach Note for WILMA Baeza  [id number]  1975    Chart reviewed, discharge follow-up plan discussed with patient's bedside RN, needs assessed. Post-delivery follow-up appointment for BP check planned in 2-5 weeks at [facility]; patient will schedule as instructed. Home nurse visit not requested, ordered, due to insurance plan not accepted by HC agency    Patient, \"Marleny\", is reported to have support at home, has baby care essentials, and feels ready to discharge today with , \"Justo Baeza\". Outreach nurse will continue to follow and assist with discharge planning as needed. No additional discharge needs reported at this time.                "

## 2022-03-08 NOTE — PLAN OF CARE
Problem: Pain (Postpartum  Delivery)  Goal: Acceptable Pain Control  Outcome: Met     Pain meds given per order and do help with pain. Marleny is resting now.      Problem: Postoperative Urinary Retention (Postpartum  Delivery)  Goal: Effective Urinary Elimination  Outcome: Met     She is up independently and able to void      Problem: Bleeding (Postpartum  Delivery)  Goal: Hemostasis  Outcome: Met     Fundus firm, acceptable bleeding, vitals stable    Marleny scored a 16 on her post partum mood assessment.  Marleny refused a social work consult and that she had a plan for after discharge. Dr. Landa notified and agreed that she can follow up outside the hospital.    Discharge Instructions were discussed with Marleny and her Significant other. She stated no further questions and signed for discharge. She knows to see Dr. Fierro in 2-5 days for follow up appointment.

## 2022-03-08 NOTE — PROGRESS NOTES
Mom sitting up on couch bed feeding baby. Called for donor milk to be heated up. Feels she can tolerate her pain meds now and requests them. Informed her of baby's car seat trial and to call when she is done feeding baby and to have the car seat adjusted to fit the baby. She understands and her  will help with this.

## 2022-03-10 LAB
PATH REPORT.COMMENTS IMP SPEC: NORMAL
PATH REPORT.COMMENTS IMP SPEC: NORMAL
PATH REPORT.FINAL DX SPEC: NORMAL
PATH REPORT.GROSS SPEC: NORMAL
PATH REPORT.MICROSCOPIC SPEC OTHER STN: NORMAL
PATH REPORT.RELEVANT HX SPEC: NORMAL
PHOTO IMAGE: NORMAL

## 2022-03-10 PROCEDURE — 88302 TISSUE EXAM BY PATHOLOGIST: CPT | Mod: 26 | Performed by: PATHOLOGY

## 2022-03-22 ENCOUNTER — DOCUMENTATION ONLY (OUTPATIENT)
Dept: PEDIATRICS | Facility: CLINIC | Age: 47
End: 2022-03-22
Payer: COMMERCIAL

## 2022-03-22 NOTE — PROGRESS NOTES
"[facility] Lactation Visit    Assessment:     difficulty in feeding at breast     Justo Baeza is a 2 week old old male infant born at 36 weeks and 0 days via C/S delivery on 3/[birth year]/2022 here for lactation support.    Justo Baeza is doing well. Justo Baeza has gained 13.1 oz since their last visit 7 days ago; approximately 1.8 oz per day.  He is up 12% from birthweight. Main concern today is inadequate weight gain. Due to this, they have been bottle-feeding to ensure he has adequate intake for growth. Infant was able to transfer 0.4 oz from the breast today. Reassurance provided that this is great given prematurity and that he has been mainly bottle-feeding. Discussed that it is okay to offer the breast before bottle-feedings. May nurse for up to 30 minutes but decrease nursing duration if infant is demonstrating ineffective nursing (less swallowing/sleeping at the breast etc.).  Given prematurity and inadequate milk transfer at the breast, recommended to continue with Neosure 22kcal fortification. See feeding plan below.      Plan:  Justo is growing well!    As discussed, below the feeding recommendations for Justo Baeza:    Feeding plan: You may offer breast before bottle-feeding. Breast-feed every 2-3 hours or more frequently based on infant cues; at least 8-12 times a day. Don't go longer than 30 minutes per feeding session. You may stop nursing before 30 minutes if Justo is tired or sleepy at the breast. When latching Justo Baeza, make sure head, neck, and body are aligned an facing you. Support breast with \"sandwich\" hold or \"C\" hold while infant is breast-feeding. To obtain a deeper latch, aim the tip of the nipple to infant's roof of the mouth (aim for the nose). Ensure lips are flanged out. If having difficulty, wait for wide gape and gently apply downward pressure to the infant's chin. If latch is painful or lips are pursed in, unlatch Justo Baeza and reposition. Make " sure to stimulate Justo Baeza to actively nurse. Listen for swallows. If swallows are less frequent, stimulate infant by tickling his hands or feet. You may also wipe a cool wash cloth on his face or hand express your breast for milk. Also skin-to-skin and undressing Justo Baeza down to her diaper can be helpful. Burp Justo Baeza before switching sides and burp again after breast-feeding. Keep Justo Baeza in upright position for about 10-15 minutes after feeding, before laying him flat on his back.    A typical feeding is 10-15 minutes on each side; total of 20-30 minutes per breast-feeding session.    Supplementation: A typical feeding volume at this age is about 2-3 oz per feeding. Justo was able to transfer 0.4 oz from the breast today. Offer 2-2.5 oz after nursing to help support his growth. Continue with Neosure 22 kcal fortification.      Pumping plan:  Continue to pump after nursing for about 10-15 minutes for added stimulation. Pump as much as you are able or 6-8 times a day. This will help encourage milk supply and production. Once Justo becomes more efficient with breast-feeding, you will require less pumping.     Continue to monitor output, expect at least 6 wet diapers per day and 2-4 stools in a day.  If Justo Baeza has less than the recommended wet diapers, please call us.     Follow up lactation in 1-1.5 weeks    Maternal nipple care:   Best way to help decrease nipple soreness is to obtain a deep latch. When you pump, nipples should not touch the sides of the flange. Apply lanolin or coconut oil after breast-feeding or pumping. Wipe away left over residue before next breast-feeding or pumping. Allow nipples to air dry as much as possible to help stimulate healing. If mother is experiencing persistent breast pain, flu-like symptoms, localized breast tenderness/redness/warmness, or fevers, please contact mother's primary care provider or Obstetrician/midwife for further  evaluation.    Return to clinic sooner or call clinic sooner for any worsening symptoms (inconsolability, fever greater than 100.4F, less wet diapers, no stools, sleepiness, difficulty feeding, abdominal distention).    For further lactation concerns, please call [phone].     ____________________________________________________________________  SUBJECTIVE:     Justo Baeza is a 2 week old old male infant born at Gestational Age: 36w0d via , Low Transverse delivery on 3/5/2022 at 1:09 PM here for lactation support. This patient is accompanied in the office by his mother and father.     Concerns: inadequate weight gain. Bottle-feeding every 3 hours. Using Dr. Reyes nipple. Takes about 50-70 ml per feeding. Formulating 22 kcal formula with breast-milk. Not offering breast milk due to inadequate weight gain.    Baby has about 8 wet diapers and 6 stools per day. Stools are yellwo in color.    Mom is also pumping about 8 per day and gets about  oz per pumping session. Mom noticed her breasts grew larger and areolas darkened during pregnancy and she noticed primary engorgement when her milk came in on day 7.    Breastfeeding Goals: make sure to gain weight well and be able to breast-feed    Previous Breastfeeding Experience: breast fed for 18 months, 20 years ago.    Breast-surgery: none    Maternal medications: labetalol, elavil, butalbital, dicyclomine, flexeril, tylenol, ibuprofen, and oxycodone.  Infant medications: multivitamin       metabolic screening: normal.    Patient Active Problem List    Diagnosis Date Noted      , gestational age 36 completed weeks 2022     Priority: Medium     No results found for any visits on 22.    Current Outpatient Medications:      pediatric multivitamin w/iron (POLY-VI-SOL W/IRON) 11 MG/ML solution, Take 1 mL by mouth daily, Disp: 50 mL, Rfl: 3     DONOR HUMAN MILK FOR SUPPLEMENTATION, To be used for supplementation. (Patient not  taking: Reported on 3/22/2022), Disp: 1200 mL, Rfl: 4  No past medical history on file.  No past surgical history on file.  No family history on file.    Primary care provider: Grazyna Fonseca    OBJECTIVE:    Mother:   Nipples are everted, the areola is compressible, the breast is soft and full.     Sore nipples: sore from pumping. No active bleeding.  Maternal pain: sorness    Maternal depression screening: mother given form, but not completed (form was not in the room after family left)    Infant:   Vitals:    03/22/22 1252   Temp: 99.2  F (37.3  C)   TempSrc: Rectal   Weight: 5 lb 14.4 oz (2.676 kg)       Average weight gain over last 7 days: 13.1 oz     Weight:   Birthweight: 5 lbs 4.3 oz  Clinic weight on 3/15: 5 lbs 1.3 oz  Today's weight: 5 lbs 14.4 oz    12% from birth weight.    Amount of milk transferred from LEFT side: 0.4 oz  Amount of milk transferred from RIGHT side: 0 oz    Total transfer: 0.4 oz    Feeding assessment:     Infant can draw gloved finger into mouth. Infant demonstrated a coordinated suck. Infant palate is intact, tongue over gums, normal frenulum.   Infant can hold suction with tongue while at the breast for about 10-15 minutes. Then requires stimulation to encourage sucking. Infant was able to sustain a longer latch on the left side, but required more stimulation on the right side (second side). Infant needed frequent stimulation at the breast.     Alignment: Infant's head was aligned with its trunk. Infant did face mother. Discussed importance of infant ventral positioning to obtain a deeper latch.     Areolar Grasp:  Infant was assisted by gently applying downward pressure to the chin to open mouth wide. Infant's lips were not pursed. Infant's lips did flange outward. Tongue was visible just barely over bottom lip. Infant had complete seal.     Areolar Compression: Infant made rhythmic motion. There were no clicking or smacking sounds. There was no severe nipple discomfort.  Nipples  "appeared round after feeding.    Audible swallowing: Infant made quiet sounds of swallowing. There was an increase in frequency after milk ejection reflex.     PHYSICAL EXAM    Gen: Alert, no acute distress.   Head: Anterior and posterior fontanelles are flat and soft.   Eyes: No eye drainage. Sclera clear.  Ears: Pinna appear well-formed. No pits.   Nose: nares patent. No nasal congestion. No nasal flaring.  Mouth: Oral mucosa moist. Tongue midline (tongue elevation adequate when crying, good lateralization). Frenulum palpable. No \"heart-shaped\" tongue. Tongue able to extend pass lower gumline. Lips closed at rest.   Neck: Clavicles intact bilaterally.  Lungs: Clear to auscultation bilaterally.   Cardiac: Regular regular rate and rhythm, S1S2, no murmurs. Brachial and femoral pulses +2 and equal.  Abdomen: Soft, nontender, bowel sounds present, no hepatosplenomegaly or mass palpable. Umbilicus dry with no erythema or drainage.   : Rajan stage 1 male genitalia  Skin: Intact. Dry. No rash. No jaundice.   Musculoskeletal: equal movements of upper and lower extremities.   Neuro: Appropriate muscle tone.    The visit lasted a total of 55 minutes that I spent face to face with the patient. Of that time, 55 minutes were spent on lactation. Over 50% of the time was spent counseling and educating the patient about normal  care and growth, breast-feeding, latching, milk supply, supplementation.    Completed by:   Mariely Preciado, DONNY, CPNP, IBCLC  [facility]  [facility]  3/22/2022, 12:49 PM      "

## 2022-04-08 NOTE — DISCHARGE SUMMARY
[facility] Discharge Summary    Pao Baeza MRN# [id number]   Age: 46 year old YOB: 1975     Date of Admission:  3/5/2022  Date of Discharge::  3/8/2022  3:30 PM  Admitting Physician:  Chanda Luevano MD  Discharge Physician:  Annie Landa MD     Home clinic: [facility]            Admission Diagnoses:   Previous  section [Z98.891]  Encounter for triage in pregnant patient [Z36.89]  S/P  section [Z98.891]          Discharge Diagnosis:   S/P repeat low transverse  [Z98.891]          Procedures:   Procedure(s): Repeat low transverse  section with bilateral tubal ligation (BTL)                   Medications Prior to Admission:     No medications prior to admission.             Discharge Medications:     Discharge Medication List as of 3/8/2022 11:28 AM      START taking these medications    Details   labetalol (NORMODYNE) 200 MG tablet Take 1 tablet (200 mg) by mouth every 12 hours, Disp-14 tablet, R-0, E-Prescribe      oxyCODONE (ROXICODONE) 5 MG tablet Take 1-2 tablets (5-10 mg) by mouth every 4 hours as needed for severe pain, Disp-16 tablet, R-0, E-Prescribe         CONTINUE these medications which have NOT CHANGED    Details   acetone urine (KETOSTIX) test strip Check ketones with morning urine dailyDisp-50 strip, [id number]      alcohol swab prep pads Use to swab area of injection/slim as directed.Disp-100 each, [id number]      amitriptyline (ELAVIL) 10 MG tablet Take 2 tabs by mouth at bedtime, may take 1-2 tabs during the day if needed., Historical      blood glucose (NO BRAND SPECIFIED) test strip Use to test blood sugar 4 times daily or as directed. To accompany: Blood Glucose Monitor Brands: per insurance., Disp-100 strip, R-6, E-Prescribe      blood glucose monitoring (NO BRAND SPECIFIED) meter device kit Use to test blood sugar 4 times daily or as directed. Preferred blood glucose meter OR supplies to accompany: Blood  Glucose Monitor Brands: per insurance.Disp-1 kit, [id number]      butalbital-acetaminophen-caffeine (ESGIC) -40 MG tablet TAKE 2 TABLETS BY MOUTH EVERY 6 HOURS AS NEEDED FOR HEADACHES, Disp-60 tablet, R-5, E-Prescribe      cyclobenzaprine (FLEXERIL) 5 MG tablet Take 10 mg by mouth nightly as needed, Historical      divalproex sodium extended-release (DEPAKOTE ER) 250 MG 24 hr tablet Take 1 tablet (250 mg) by mouth daily MUST CALL TO SCHEDULE FOLLOW UP APPT, Disp-90 tablet, R-3, E-Prescribe      gabapentin (NEURONTIN) 300 MG capsule Take 600 mg by mouth 3 times daily, Historical      lamoTRIgine (LAMICTAL) 25 MG tablet Take 2 tablets (50 mg) by mouth daily **FOLLOW UP NEEDED FOR REFILLS**, Disp-28 tablet, R-0, E-Prescribe      lidocaine (XYLOCAINE) 5 % external ointment Apply topically as neededHistorical      Multiple Vitamin (DAILY REGINA) TABS Take 1 tablet by mouth daily, Historical      Prenatal Vit-Fe Fumarate-FA (PRENATAL MULTIVITAMIN  PLUS IRON) 27-1 MG TABS Take 1 tablet by mouth daily, Historical      promethazine (PHENERGAN) 25 MG tablet Take by mouth as needed, Historical      rizatriptan (MAXALT) 10 MG tablet Take 1 tablet (10 mg) by mouth as needed for migraine, Disp-12 tablet, R-11, E-Prescribe      thin (NO BRAND SPECIFIED) lancets Use with lanceting device. To accompany: Blood Glucose Monitor Brands: per insurance., Disp-100 each, R-6, E-Prescribe      tiZANidine (ZANAFLEX) 2 MG tablet Take 2 mg by mouth daily as needed, Historical         STOP taking these medications       aspirin 81 MG EC tablet Comments:   Reason for Stopping:         atenolol (TENORMIN) 50 MG tablet Comments:   Reason for Stopping:         clobetasol (TEMOVATE) 0.05 % external ointment Comments:   Reason for Stopping:         desogestrel-ethinyl estradiol (APRI) 0.15-30 MG-MCG tablet Comments:   Reason for Stopping:         dicyclomine (BENTYL) 20 MG tablet Comments:   Reason for Stopping:         fluticasone (FLONASE)  50 MCG/ACT nasal spray Comments:   Reason for Stopping:         hydrOXYzine (VISTARIL) 25 MG capsule Comments:   Reason for Stopping:         ondansetron (ZOFRAN-ODT) 4 MG ODT tab Comments:   Reason for Stopping:                     Consultations:   No consultations were requested during this admission          Brief History of Labor or Admission:   Admitted for repeat csection with bilateral tubal ligation (BTL).  No surgical complications.            Hospital Course:   The patient's hospital course was unremarkable.  She recovered as anticipated and experienced no post-operative complications.  On discharge, her pain was well controlled. Vaginal bleeding is similar to peak menstrual flow.  Voiding without difficulty.  Ambulating well and tolerating a normal diet.  No fever or significant wound drainage.  Breastfeeding well.  Infant is stable.  No bowel movement yet.  She was discharged on post-partum day #2.    Post-partum hemoglobin:   Hemoglobin   Date Value Ref Range Status   03/06/2022 12.6 11.7 - 15.7 g/dL Final             Discharge Instructions and Follow-Up:   Discharge diet: Regular   Discharge activity: No heavy lifting for 6 week(s)  No driving or operating machinery while on narcotic analgesics   Discharge follow-up: Follow up with Dr. howell in 2 weeks   Wound care: May remove bandages in 3-5 days if still present           Discharge Disposition:   Discharged to home      Attestation:  I have reviewed today's vital signs, notes, medications, labs and imaging.    Annie Landa MD

## 2022-05-22 ENCOUNTER — HEALTH MAINTENANCE LETTER (OUTPATIENT)
Age: 47
End: 2022-05-22

## 2022-09-24 ENCOUNTER — HEALTH MAINTENANCE LETTER (OUTPATIENT)
Age: 47
End: 2022-09-24

## 2022-11-10 NOTE — TELEPHONE ENCOUNTER
Refill request for Lamictal. Pt last seen by Dr. Mercer on 2/3/21 and is currently over due for follow up. [employer] message has been sent to pt. Will send 14 day supply with zero refills.     Gladys Mckinney RN on 2/25/2022 at 8:34 AM     Bexarotene Counseling:  I discussed with the patient the risks of bexarotene including but not limited to hair loss, dry lips/skin/eyes, liver abnormalities, hyperlipidemia, pancreatitis, depression/suicidal ideation, photosensitivity, drug rash/allergic reactions, hypothyroidism, anemia, leukopenia, infection, cataracts, and teratogenicity.  Patient understands that they will need regular blood tests to check lipid profile, liver function tests, white blood cell count, thyroid function tests and pregnancy test if applicable.

## 2023-01-29 ENCOUNTER — HEALTH MAINTENANCE LETTER (OUTPATIENT)
Age: 48
End: 2023-01-29

## 2023-06-04 ENCOUNTER — HEALTH MAINTENANCE LETTER (OUTPATIENT)
Age: 48
End: 2023-06-04

## 2024-07-14 ENCOUNTER — HEALTH MAINTENANCE LETTER (OUTPATIENT)
Age: 49
End: 2024-07-14

## 2025-02-15 ENCOUNTER — HEALTH MAINTENANCE LETTER (OUTPATIENT)
Age: 50
End: 2025-02-15

## 2025-03-27 ENCOUNTER — APPOINTMENT (OUTPATIENT)
Dept: RADIOLOGY | Facility: HOSPITAL | Age: 50
End: 2025-03-27
Attending: EMERGENCY MEDICINE
Payer: COMMERCIAL

## 2025-03-27 ENCOUNTER — HOSPITAL ENCOUNTER (EMERGENCY)
Facility: HOSPITAL | Age: 50
Discharge: HOME | End: 2025-03-27
Attending: EMERGENCY MEDICINE
Payer: COMMERCIAL

## 2025-03-27 VITALS
DIASTOLIC BLOOD PRESSURE: 94 MMHG | BODY MASS INDEX: 26.52 KG/M2 | HEIGHT: 66 IN | RESPIRATION RATE: 21 BRPM | HEART RATE: 94 BPM | TEMPERATURE: 98 F | OXYGEN SATURATION: 100 % | WEIGHT: 165 LBS | SYSTOLIC BLOOD PRESSURE: 153 MMHG

## 2025-03-27 DIAGNOSIS — I10 ELEVATED BLOOD PRESSURE READING WITH DIAGNOSIS OF HYPERTENSION: ICD-10-CM

## 2025-03-27 DIAGNOSIS — R06.02 SHORTNESS OF BREATH: ICD-10-CM

## 2025-03-27 PROBLEM — O24.419 GESTATIONAL DIABETES: Status: ACTIVE | Noted: 2022-01-31

## 2025-03-27 PROBLEM — F41.9 ANXIETY: Status: ACTIVE | Noted: 2023-07-28

## 2025-03-27 PROBLEM — Z86.69 HISTORY OF MIGRAINE WITH AURA: Status: ACTIVE | Noted: 2025-03-27

## 2025-03-27 PROBLEM — K52.9 INFLAMMATORY BOWEL DISEASE: Status: ACTIVE | Noted: 2024-11-08

## 2025-03-27 PROBLEM — R87.619 ATYPICAL GLANDULAR CELLS ON CERVICAL PAP SMEAR: Status: ACTIVE | Noted: 2024-11-08

## 2025-03-27 PROBLEM — F41.8 MIXED ANXIETY AND DEPRESSIVE DISORDER: Status: ACTIVE | Noted: 2021-09-14

## 2025-03-27 PROBLEM — N32.9 BLADDER PROBLEM: Status: ACTIVE | Noted: 2021-09-14

## 2025-03-27 PROBLEM — J30.2 SEASONAL ALLERGIC RHINITIS: Status: ACTIVE | Noted: 2024-11-08

## 2025-03-27 PROBLEM — Z87.51 HISTORY OF PRETERM DELIVERY: Status: ACTIVE | Noted: 2025-03-27

## 2025-03-27 LAB
ANION GAP SERPL CALCULATED.3IONS-SCNC: 11 MMOL/L (ref 7–15)
ATRIAL RATE - MUSE: 93 BPM
BASE EXCESS BLDV CALC-SCNC: 4.7 MMOL/L (ref -3–3)
BUN SERPL-MCNC: 7.9 MG/DL (ref 6–20)
CALCIUM SERPL-MCNC: 9 MG/DL (ref 8.8–10.4)
CHLORIDE SERPL-SCNC: 101 MMOL/L (ref 98–107)
CREAT SERPL-MCNC: 0.74 MG/DL (ref 0.51–0.95)
D DIMER PPP FEU-MCNC: <0.27 UG/ML FEU (ref 0–0.5)
DIASTOLIC BLOOD PRESSURE - MUSE: 87 MMHG
EGFRCR SERPLBLD CKD-EPI 2021: >90 ML/MIN/1.73M2
ERYTHROCYTE [DISTWIDTH] IN BLOOD BY AUTOMATED COUNT: 13.2 % (ref 10–15)
GLUCOSE SERPL-MCNC: 120 MG/DL (ref 70–99)
HCO3 BLDV-SCNC: 27 MMOL/L (ref 21–28)
HCO3 SERPL-SCNC: 27 MMOL/L (ref 22–29)
HCT VFR BLD AUTO: 44.8 % (ref 35–47)
HGB BLD-MCNC: 14.6 G/DL (ref 11.7–15.7)
HOLD SPECIMEN: NORMAL
INTERPRETATION ECG - MUSE: NORMAL
MCH RBC QN AUTO: 28 PG (ref 26.5–33)
MCHC RBC AUTO-ENTMCNC: 32.6 G/DL (ref 31.5–36.5)
MCV RBC AUTO: 86 FL (ref 78–100)
O2/TOTAL GAS SETTING VFR VENT: 21 %
OXYHGB MFR BLDV: 56 % (ref 70–75)
P AXIS - MUSE: 67 DEGREES
PCO2 BLDV: 32 MM HG (ref 40–50)
PH BLDV: 7.54 [PH] (ref 7.32–7.43)
PLATELET # BLD AUTO: 305 10E3/UL (ref 150–450)
PO2 BLDV: 27 MM HG (ref 25–47)
POTASSIUM SERPL-SCNC: 3.4 MMOL/L (ref 3.4–5.3)
PR INTERVAL - MUSE: 174 MS
QRS DURATION - MUSE: 94 MS
QT - MUSE: 406 MS
QTC - MUSE: 504 MS
R AXIS - MUSE: 67 DEGREES
RBC # BLD AUTO: 5.21 10E6/UL (ref 3.8–5.2)
SAO2 % BLDV: 56.5 % (ref 70–75)
SODIUM SERPL-SCNC: 139 MMOL/L (ref 135–145)
SYSTOLIC BLOOD PRESSURE - MUSE: 146 MMHG
T AXIS - MUSE: 139 DEGREES
TROPONIN T SERPL HS-MCNC: <6 NG/L
VENTRICULAR RATE- MUSE: 93 BPM
WBC # BLD AUTO: 6.2 10E3/UL (ref 4–11)

## 2025-03-27 PROCEDURE — 80048 BASIC METABOLIC PNL TOTAL CA: CPT | Performed by: EMERGENCY MEDICINE

## 2025-03-27 PROCEDURE — 71045 X-RAY EXAM CHEST 1 VIEW: CPT

## 2025-03-27 PROCEDURE — 85027 COMPLETE CBC AUTOMATED: CPT | Performed by: EMERGENCY MEDICINE

## 2025-03-27 PROCEDURE — 82805 BLOOD GASES W/O2 SATURATION: CPT | Performed by: EMERGENCY MEDICINE

## 2025-03-27 PROCEDURE — 84484 ASSAY OF TROPONIN QUANT: CPT | Performed by: EMERGENCY MEDICINE

## 2025-03-27 PROCEDURE — 99285 EMERGENCY DEPT VISIT HI MDM: CPT | Mod: 25

## 2025-03-27 PROCEDURE — 36415 COLL VENOUS BLD VENIPUNCTURE: CPT | Performed by: EMERGENCY MEDICINE

## 2025-03-27 PROCEDURE — 85379 FIBRIN DEGRADATION QUANT: CPT | Performed by: EMERGENCY MEDICINE

## 2025-03-27 PROCEDURE — 93005 ELECTROCARDIOGRAM TRACING: CPT | Performed by: EMERGENCY MEDICINE

## 2025-03-27 ASSESSMENT — COLUMBIA-SUICIDE SEVERITY RATING SCALE - C-SSRS
1. IN THE PAST MONTH, HAVE YOU WISHED YOU WERE DEAD OR WISHED YOU COULD GO TO SLEEP AND NOT WAKE UP?: NO
6. HAVE YOU EVER DONE ANYTHING, STARTED TO DO ANYTHING, OR PREPARED TO DO ANYTHING TO END YOUR LIFE?: NO
2. HAVE YOU ACTUALLY HAD ANY THOUGHTS OF KILLING YOURSELF IN THE PAST MONTH?: NO

## 2025-03-27 ASSESSMENT — ACTIVITIES OF DAILY LIVING (ADL)
ADLS_ACUITY_SCORE: 42
ADLS_ACUITY_SCORE: 42

## 2025-03-27 NOTE — DISCHARGE INSTRUCTIONS
Thankfully no sign of heart attack, blood clot, pneumonia, or other dangerous cause of your symptoms today.  Please continue with your current medication regimen, reach out to your primary care clinic for follow-up.

## 2025-03-27 NOTE — ED PROVIDER NOTES
EMERGENCY DEPARTMENT ENCOUNTER      NAME: Pao Baeza  AGE: 49 year old female  YOB: 1975  MRN: [id number]  EVALUATION DATE & TIME: 3/27/2025  8:08 AM    PCP: Taylor Devine    ED PROVIDER: Cirilo Chapa M.D.      Chief Complaint   Patient presents with    Shortness of Breath         FINAL IMPRESSION:  1. Shortness of breath    2. Elevated blood pressure reading with diagnosis of hypertension          ED COURSE & MEDICAL DECISION MAKING:    Pertinent Labs & Imaging studies reviewed below.  All EKGs below represent my independent interpretation.     ED Course as of 03/27/25 1225   Thu Mar 27, 2025   0834 Patient is a 49-year-old woman with history of hypertension who presents with shortness of breath, racing heart, tingling in hands, numbness to the feet.  Symptoms began yesterday, she found corresponding blood pressure in the 190s systolic.  She manages this with additional blood pressure medications at home last night.  Here in the emergency department today she has a blood pressure of 139/85 with normal temperature, heart rate of 97, normal respiratory rate and oxygenation.  She seems to be taking deep breaths, is mildly anxious.  She has no history of DVT, but plan to get a D-dimer to rule out PE.  Otherwise arrhythmia, ACS screening underway.  If workup is normal, symptoms susanne be more likely due to anxiety, panic attack.   0835 EKG shows sinus rhythm with a rate of 93.  No acute ischemic ST or T wave morphology.  Normal axis, prolonged QTc at 504 ms, normal QRS and IN interval.  When compared to prior EKG on December 7, 2021, there is no significant change.  Impression: Sinus rhythm with a rate of 93   0849 Ph Venous(!): 7.54  C/w respiratory alkalosis   0849 D-Dimer Quantitative: <0.27  PE ruled out. She is low risk   She feels much better now.  We discussed my reassuring exam findings, normal troponin, normal D-dimer.  Because of her respiratory alkalosis, I suspect her paresthesias  fingers and toes were due to this.  She denies feeling anxious or distressed yesterday prior to symptom onset.  At this point no emergent pathology identified, safe for follow-up with primary care doctor    Additional ED Course timestamps entered by medical scribe:  8:14 AM I met with the patient, obtained history, performed an initial exam, and discussed options and plan for diagnostics and treatment here in the ED.       Medical Decision Making  I obtained history from Family Member/Significant Other and Care impacted by Hypertension  Discharge. No recommendations on prescription strength medication(s). I considered admission, but ultimately discharged patient after reassuring labwork and ECG.    MIPS (CTPE, Dental pain, Paige, Sinusitis, Asthma/COPD, Head Trauma): Not Applicable    SEPSIS: None    MEDICATIONS GIVEN IN THE EMERGENCY:  Medications - No data to display      NEW PRESCRIPTIONS STARTED AT TODAY'S ER VISIT  Discharge Medication List as of 3/27/2025 10:16 AM             =================================================================    HPI    Pao Baeza is a 49 year old female who presents to this ED for evaluation of high blood pressure, fatigue, and lightheadedness.     The patient reports that she has a history of hypertension and regularly checks her blood pressure at work. Yesterday morning, she wasn't feeling well with nausea and lightheadedness. When she checked her blood pressure at work around 3:00 PM yesterday, it was elevated. Her  reports that the highest reading was 194/134, and patient had some associated tachycardia. Patient says that usually when her blood pressure is elevated, it will come down after sitting and trying to calm herself, but this did not work yesterday. She laid down on the ground and rechecked it, still in the 160s systolic.     Since yesterday, patient has had ongoing nausea, lightheadedness, fatigue, and shortness of breath. She notes that even getting up  "and getting dressed fatigues her, and she will become short of breath with walking across the house. Currently in the ED, she endorses lightheadedness, numbness in her fingers, a cold feeling in her toes, and shortness of breath. Patient has not taken her morning medications yet today.     Denies abdominal pain, vomiting, leg swelling, dark or bloody stools, cough, sore throat, chance of pregnancy, or any other concerns at this time. Patient says she felt at her baseline 2 days ago, prior to her symptom onset. No history of heart disease, but patient has worn Holter monitors 2 separate times with no findings.     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       VITALS:  BP (!) 153/94   Pulse 94   Temp 98  F (36.7  C)   Resp 21   Ht 1.676 m (5' 6\")   Wt 74.8 kg (165 lb)   SpO2 100%   BMI 26.63 kg/m      PHYSICAL EXAM    Constitutional: Anxious appearing.  HENT: Atraumatic, mucous membranes moist, nose normal. Neck- Supple, gross ROM intact.   Eyes: Pupils mid-range, conjunctiva without injection, no discharge.   Respiratory: Clear to auscultation bilaterally, no respiratory distress, no wheezing, speaks full sentences easily. No cough.  Cardiovascular: Mildly tachycardic, regular rhythm, no murmurs.   GI: Soft, no tenderness to deep palpation in all quadrants, no masses.  Musculoskeletal: Moving all 4 extremities intentionally and without pain. No obvious deformity.  Skin: Warm, dry, no rash.  Neurologic: Alert & oriented x 3, cranial nerves grossly intact.  Psychiatric: Affect normal, cooperative.      I, Sara Sparks am serving as a scribe to document services personally performed by Dr. Cirilo Chapa based on my observation and the provider's statements to me. ICirilo MD attest that Sara Sparks is acting in a scribe capacity, has observed my performance of the services and has documented them in accordance with my direction.    Cirilo Chapa M.D.  Emergency " Medicine  [facility] EMERGENCY DEPARTMENT  [facility] [facility] [id number] [phone]  Dept: [phone]       Cirilo Chapa MD  03/27/25 1220

## 2025-03-27 NOTE — ED TRIAGE NOTES
"Patient c/o shortness of breath started yesterday around 3 pm while working in the computer, denied chest pain . Patient \" out of breath while talking to the nurse \".      Triage Assessment (Adult)       Row Name 03/27/25 0811          Triage Assessment    Airway WDL WDL        Respiratory WDL    Respiratory WDL all     Rhythm/Pattern, Respiratory shortness of breath        Skin Circulation/Temperature WDL    Skin Circulation/Temperature WDL WDL        Cardiac WDL    Cardiac WDL WDL        Peripheral/Neurovascular WDL    Peripheral Neurovascular WDL WDL        Cognitive/Neuro/Behavioral WDL    Cognitive/Neuro/Behavioral WDL WDL                     "

## 2025-07-19 ENCOUNTER — HEALTH MAINTENANCE LETTER (OUTPATIENT)
Age: 50
End: 2025-07-19

## (undated) DEVICE — SUCTION TIP YANKAUER W/O VENT K86

## (undated) DEVICE — SU PLAIN 3-0 XLH  27" 52T

## (undated) DEVICE — CUSTOM PACK C-SECTION LHE

## (undated) DEVICE — GLOVE UNDER INDICATOR PI SZ 7.0 LF 41670

## (undated) DEVICE — SOL WATER IRRIG 1000ML BOTTLE 2F7114

## (undated) DEVICE — SUTURE VICRYL+ 0 36IN CTX VIOLET VCP978H

## (undated) DEVICE — DRAPE IOBAN 2 C-SECTION 3M 6697

## (undated) DEVICE — UNDERPAD 30X30 BULK 949B10

## (undated) DEVICE — SU PLAIN 0 TIE 54" S104H

## (undated) DEVICE — CATH SUCTION 10FR W/TRAP DYND44110

## (undated) DEVICE — DRSG PRIMAPORE 04X11 3/4"

## (undated) DEVICE — SUCTION KIWI VAC  VAC-6000M

## (undated) DEVICE — CUSTOM CATH LAB BASIN SET PACK 640PACK LHE SUTCNBPFCA

## (undated) DEVICE — SUTURE VICRYL+ 3-0 27IN CT-1 UND VCP258H

## (undated) DEVICE — SOL NACL 0.9% IRRIG 1000ML BOTTLE 2F7124

## (undated) DEVICE — PLATE GROUNDING ADULT W/CORD 9165L

## (undated) DEVICE — PACK MAJOR BASIN 673

## (undated) DEVICE — PREP CHLORAPREP 26ML TINTED HI-LITE ORANGE 930815

## (undated) DEVICE — PAD INSERT MED PEACH 14X6.5 62550

## (undated) DEVICE — SUTURE MONOCRYL+ 0 CT-1 UND 18 MCP946H

## (undated) DEVICE — LINER PRINTED RED HAZARD 24X24 A4824PRRO

## (undated) DEVICE — SU VICRYL 0 CTX 36" J370H

## (undated) DEVICE — STPL SKIN SUBCUTICULAR INSORB  2030

## (undated) DEVICE — SUCTION MANIFOLD NEPTUNE 2 SYS 1 PORT 702-025-000

## (undated) DEVICE — GLOVE SURG PI ULTRA TOUCH M SZ 6-1/2 LF

## (undated) RX ORDER — ONDANSETRON 2 MG/ML
INJECTION INTRAMUSCULAR; INTRAVENOUS
Status: DISPENSED
Start: 2022-03-05

## (undated) RX ORDER — MORPHINE SULFATE 1 MG/ML
INJECTION, SOLUTION EPIDURAL; INTRATHECAL; INTRAVENOUS
Status: DISPENSED
Start: 2022-03-05

## (undated) RX ORDER — EPHEDRINE SULFATE 50 MG/ML
INJECTION, SOLUTION INTRAMUSCULAR; INTRAVENOUS; SUBCUTANEOUS
Status: DISPENSED
Start: 2022-03-05

## (undated) RX ORDER — FENTANYL CITRATE-0.9 % NACL/PF 10 MCG/ML
PLASTIC BAG, INJECTION (ML) INTRAVENOUS
Status: DISPENSED
Start: 2022-03-05